# Patient Record
Sex: FEMALE | Race: WHITE | NOT HISPANIC OR LATINO | Employment: OTHER | URBAN - METROPOLITAN AREA
[De-identification: names, ages, dates, MRNs, and addresses within clinical notes are randomized per-mention and may not be internally consistent; named-entity substitution may affect disease eponyms.]

---

## 2023-04-20 ENCOUNTER — OFFICE VISIT (OUTPATIENT)
Dept: PRIMARY CARE | Facility: CLINIC | Age: 53
End: 2023-04-20
Payer: COMMERCIAL

## 2023-04-20 VITALS
SYSTOLIC BLOOD PRESSURE: 100 MMHG | OXYGEN SATURATION: 98 % | WEIGHT: 140 LBS | BODY MASS INDEX: 25.76 KG/M2 | TEMPERATURE: 97.2 F | DIASTOLIC BLOOD PRESSURE: 70 MMHG | HEIGHT: 62 IN | HEART RATE: 88 BPM

## 2023-04-20 DIAGNOSIS — J01.00 ACUTE NON-RECURRENT MAXILLARY SINUSITIS: Primary | ICD-10-CM

## 2023-04-20 PROBLEM — R51.9 HEADACHE: Status: ACTIVE | Noted: 2023-04-20

## 2023-04-20 PROBLEM — M06.4 POLYARTHROPATHY, INFLAMMATORY (MULTI): Status: ACTIVE | Noted: 2023-04-20

## 2023-04-20 PROBLEM — E03.9 ADULT HYPOTHYROIDISM: Status: ACTIVE | Noted: 2023-04-20

## 2023-04-20 PROBLEM — M25.50 ARTHRALGIA OF MULTIPLE SITES: Status: ACTIVE | Noted: 2023-04-20

## 2023-04-20 PROBLEM — M79.646 FINGER PAIN: Status: ACTIVE | Noted: 2023-04-20

## 2023-04-20 PROBLEM — N95.0 POSTMENOPAUSAL BLEEDING: Status: ACTIVE | Noted: 2023-04-20

## 2023-04-20 PROBLEM — R92.30 DENSE BREAST TISSUE ON MAMMOGRAM: Status: ACTIVE | Noted: 2023-04-20

## 2023-04-20 PROBLEM — G43.909 MIGRAINE: Status: ACTIVE | Noted: 2023-04-20

## 2023-04-20 PROBLEM — R92.8 ABNORMAL MAMMOGRAM OF LEFT BREAST: Status: ACTIVE | Noted: 2023-04-20

## 2023-04-20 PROBLEM — E28.39 PREMATURE OVARIAN FAILURE: Status: ACTIVE | Noted: 2023-04-20

## 2023-04-20 PROBLEM — R31.9 HEMATURIA: Status: ACTIVE | Noted: 2023-04-20

## 2023-04-20 PROBLEM — F32.A DEPRESSION: Status: ACTIVE | Noted: 2023-04-20

## 2023-04-20 PROBLEM — M20.012 MALLET FINGER OF LEFT HAND: Status: ACTIVE | Noted: 2023-04-20

## 2023-04-20 PROCEDURE — 1036F TOBACCO NON-USER: CPT | Performed by: INTERNAL MEDICINE

## 2023-04-20 PROCEDURE — 99213 OFFICE O/P EST LOW 20 MIN: CPT | Performed by: INTERNAL MEDICINE

## 2023-04-20 RX ORDER — CELECOXIB 200 MG/1
CAPSULE ORAL
COMMUNITY
Start: 2022-01-25 | End: 2023-06-23

## 2023-04-20 RX ORDER — SUMATRIPTAN SUCCINATE 100 MG/1
TABLET ORAL
COMMUNITY
Start: 2021-12-08

## 2023-04-20 RX ORDER — PHENTERMINE HYDROCHLORIDE 37.5 MG/1
1 TABLET ORAL DAILY
COMMUNITY
Start: 2022-10-05 | End: 2024-06-05 | Stop reason: ALTCHOICE

## 2023-04-20 RX ORDER — CHOLECALCIFEROL (VITAMIN D3) 50 MCG
TABLET ORAL
COMMUNITY
Start: 2018-06-29

## 2023-04-20 RX ORDER — PNV NO.95/FERROUS FUM/FOLIC AC 28MG-0.8MG
TABLET ORAL
COMMUNITY
Start: 2018-06-29

## 2023-04-20 RX ORDER — CETIRIZINE HYDROCHLORIDE 10 MG/1
10 TABLET ORAL DAILY
Qty: 30 TABLET | Refills: 2 | Status: SHIPPED | OUTPATIENT
Start: 2023-04-20 | End: 2023-07-19

## 2023-04-20 RX ORDER — ESTRADIOL AND NORETHINDRONE ACETATE 1; .5 MG/1; MG/1
1 TABLET ORAL DAILY
COMMUNITY
Start: 2020-10-27 | End: 2024-06-07 | Stop reason: SDUPTHER

## 2023-04-20 RX ORDER — BUPROPION HYDROCHLORIDE 300 MG/1
1 TABLET ORAL DAILY
COMMUNITY
Start: 2021-10-14 | End: 2024-04-03 | Stop reason: SDUPTHER

## 2023-04-20 RX ORDER — LEVOTHYROXINE SODIUM 150 UG/1
1 TABLET ORAL DAILY
COMMUNITY
End: 2023-04-24

## 2023-04-20 RX ORDER — AZITHROMYCIN 250 MG/1
TABLET, FILM COATED ORAL
Qty: 6 TABLET | Refills: 0 | Status: SHIPPED | OUTPATIENT
Start: 2023-04-20 | End: 2023-04-25

## 2023-04-20 ASSESSMENT — ENCOUNTER SYMPTOMS
SORE THROAT: 0
SINUS PRESSURE: 1
CHILLS: 0
BACK PAIN: 0
HEADACHES: 1
CHEST TIGHTNESS: 0
NAUSEA: 0
DIAPHORESIS: 0
NECK PAIN: 0
COUGH: 1
CONFUSION: 0
PALPITATIONS: 0
DIZZINESS: 0
AGITATION: 0
SHORTNESS OF BREATH: 1
SWOLLEN GLANDS: 0
DYSURIA: 0
COLOR CHANGE: 0
HEMATURIA: 0
WHEEZING: 0
VOMITING: 0
DIFFICULTY URINATING: 0
VOICE CHANGE: 1
HOARSE VOICE: 1
HALLUCINATIONS: 0

## 2023-04-20 ASSESSMENT — PAIN SCALES - GENERAL: PAINLEVEL: 0-NO PAIN

## 2023-04-20 NOTE — PROGRESS NOTES
"Subjective   Patient ID: Lynette Severino is a 52 y.o. female who presents for Sinusitis (SINUS CONGESTION PAST 2 WEEKS).    Sinusitis  This is a new problem. The current episode started 1 to 4 weeks ago. The problem has been gradually worsening since onset. There has been no fever. Her pain is at a severity of 0/10. She is experiencing no pain. Associated symptoms include congestion, coughing, headaches, a hoarse voice, shortness of breath and sinus pressure. Pertinent negatives include no chills, diaphoresis, ear pain, neck pain, sneezing, sore throat or swollen glands. Past treatments include oral decongestants. The treatment provided no relief.      Review of Systems   Constitutional:  Negative for chills and diaphoresis.   HENT:  Positive for congestion, hoarse voice, sinus pressure and voice change. Negative for ear pain, sneezing and sore throat.    Respiratory:  Positive for cough and shortness of breath. Negative for chest tightness and wheezing.    Cardiovascular:  Negative for chest pain and palpitations.   Gastrointestinal:  Negative for nausea and vomiting.   Genitourinary:  Negative for decreased urine volume, difficulty urinating, dysuria and hematuria.   Musculoskeletal:  Negative for back pain and neck pain.   Skin:  Negative for color change and rash.   Neurological:  Positive for headaches. Negative for dizziness and syncope.   Psychiatric/Behavioral:  Negative for agitation, behavioral problems, confusion and hallucinations.      Objective   /70   Pulse 88   Temp 36.2 °C (97.2 °F)   Ht 1.575 m (5' 2\")   Wt 63.5 kg (140 lb)   SpO2 98%   BMI 25.61 kg/m²     Physical Exam  Constitutional:       General: She is not in acute distress.     Appearance: She is ill-appearing. She is not toxic-appearing.   HENT:      Head: Normocephalic and atraumatic.      Nose: Congestion and rhinorrhea present.   Eyes:      General: No scleral icterus.        Right eye: No discharge.         Left eye: No " discharge.      Conjunctiva/sclera: Conjunctivae normal.   Cardiovascular:      Rate and Rhythm: Normal rate and regular rhythm.      Pulses: Normal pulses.      Heart sounds: Normal heart sounds. No murmur heard.  Pulmonary:      Effort: Pulmonary effort is normal. No respiratory distress.      Breath sounds: Rhonchi present. No wheezing.   Musculoskeletal:         General: Normal range of motion.      Cervical back: Normal range of motion.   Skin:     General: Skin is warm.      Coloration: Skin is not jaundiced.      Findings: No rash.   Neurological:      General: No focal deficit present.      Mental Status: She is alert and oriented to person, place, and time.   Psychiatric:         Mood and Affect: Mood normal.         Behavior: Behavior normal.         Thought Content: Thought content normal.         Judgment: Judgment normal.       Assessment/Plan   Problem List Items Addressed This Visit    None  Visit Diagnoses       Acute non-recurrent maxillary sinusitis    -  Primary    Relevant Medications    azithromycin (Zithromax) 250 mg tablet    cetirizine (ZyrTEC) 10 mg tablet     RTC at your next office visit.

## 2023-04-22 DIAGNOSIS — E03.9 ADULT HYPOTHYROIDISM: Primary | ICD-10-CM

## 2023-04-24 RX ORDER — LEVOTHYROXINE SODIUM 150 UG/1
TABLET ORAL
Qty: 90 TABLET | Refills: 0 | Status: SHIPPED | OUTPATIENT
Start: 2023-04-24 | End: 2023-08-21 | Stop reason: SDUPTHER

## 2023-06-22 DIAGNOSIS — M25.50 ARTHRALGIA OF MULTIPLE SITES: Primary | ICD-10-CM

## 2023-06-23 RX ORDER — CELECOXIB 200 MG/1
CAPSULE ORAL
Qty: 90 CAPSULE | Refills: 0 | Status: SHIPPED | OUTPATIENT
Start: 2023-06-23 | End: 2023-10-17

## 2023-07-18 ENCOUNTER — OFFICE VISIT (OUTPATIENT)
Dept: PRIMARY CARE | Facility: CLINIC | Age: 53
End: 2023-07-18
Payer: COMMERCIAL

## 2023-07-18 VITALS
OXYGEN SATURATION: 99 % | WEIGHT: 144 LBS | SYSTOLIC BLOOD PRESSURE: 116 MMHG | DIASTOLIC BLOOD PRESSURE: 72 MMHG | BODY MASS INDEX: 26.5 KG/M2 | TEMPERATURE: 97.5 F | HEART RATE: 86 BPM | HEIGHT: 62 IN

## 2023-07-18 DIAGNOSIS — R06.02 SHORTNESS OF BREATH: Primary | ICD-10-CM

## 2023-07-18 PROCEDURE — 99213 OFFICE O/P EST LOW 20 MIN: CPT | Performed by: INTERNAL MEDICINE

## 2023-07-18 PROCEDURE — 1036F TOBACCO NON-USER: CPT | Performed by: INTERNAL MEDICINE

## 2023-07-18 RX ORDER — ALBUTEROL SULFATE 90 UG/1
2 AEROSOL, METERED RESPIRATORY (INHALATION) EVERY 4 HOURS PRN
Qty: 8 G | Refills: 5 | Status: SHIPPED | OUTPATIENT
Start: 2023-07-18 | End: 2024-06-05 | Stop reason: ALTCHOICE

## 2023-07-18 ASSESSMENT — ENCOUNTER SYMPTOMS
SHORTNESS OF BREATH: 1
SLEEP DISTURBANCE: 0
VOICE CHANGE: 0
ABDOMINAL PAIN: 0
BACK PAIN: 0
HEMATURIA: 0
HEMOPTYSIS: 0
CHEST TIGHTNESS: 0
CONFUSION: 0
WEAKNESS: 0
APPETITE CHANGE: 0
NERVOUS/ANXIOUS: 0
SORE THROAT: 0
SINUS PAIN: 0
FLANK PAIN: 0
HALLUCINATIONS: 0
DIARRHEA: 0
VOMITING: 0
EYE PAIN: 0
ACTIVITY CHANGE: 0
DIZZINESS: 0
CONSTIPATION: 0
FACIAL ASYMMETRY: 0
SEIZURES: 0
DYSURIA: 0
WHEEZING: 0
NECK PAIN: 0
NAUSEA: 0
RHINORRHEA: 0
LEG PAIN: 0
POLYDIPSIA: 0
ADENOPATHY: 0
POLYPHAGIA: 0
SYNCOPE: 0
PALPITATIONS: 0
TROUBLE SWALLOWING: 0
NUMBNESS: 0
FATIGUE: 0
UNEXPECTED WEIGHT CHANGE: 0
SPEECH DIFFICULTY: 0
STRIDOR: 0
MYALGIAS: 0
BLOOD IN STOOL: 0
PHOTOPHOBIA: 0
HEADACHES: 0
FEVER: 0
WOUND: 0
SWOLLEN GLANDS: 0

## 2023-07-18 ASSESSMENT — COPD QUESTIONNAIRES: COPD: 0

## 2023-07-18 ASSESSMENT — PAIN SCALES - GENERAL: PAINLEVEL: 0-NO PAIN

## 2023-07-18 NOTE — PROGRESS NOTES
Subjective   Patient ID: Lynette Severino is a 53 y.o. female who presents for Shortness of Breath (Dizziness, fatigue since Thursday night).    Shortness of Breath  This is a new problem. The current episode started in the past 7 days. The problem occurs daily. The problem has been gradually worsening. Pertinent negatives include no abdominal pain, chest pain, coryza, ear pain, fever, headaches, hemoptysis, leg pain, leg swelling, neck pain, rash, rhinorrhea, sore throat, swollen glands, syncope, vomiting or wheezing. The symptoms are aggravated by any activity. The patient has no known risk factors for DVT/PE. She has tried nothing for the symptoms. Her past medical history is significant for allergies. There is no history of CAD, chronic lung disease, COPD, DVT, pneumonia or a recent surgery.      Review of Systems   Constitutional:  Negative for activity change, appetite change, fatigue, fever and unexpected weight change.   HENT:  Negative for dental problem, ear discharge, ear pain, hearing loss, nosebleeds, postnasal drip, rhinorrhea, sinus pain, sore throat, trouble swallowing and voice change.    Eyes:  Negative for photophobia, pain and visual disturbance.   Respiratory:  Positive for shortness of breath. Negative for hemoptysis, chest tightness, wheezing and stridor.    Cardiovascular:  Negative for chest pain, palpitations, leg swelling and syncope.   Gastrointestinal:  Negative for abdominal pain, blood in stool, constipation, diarrhea, nausea and vomiting.   Endocrine: Negative for polydipsia, polyphagia and polyuria.   Genitourinary:  Negative for decreased urine volume, dyspareunia, dysuria, flank pain, hematuria and urgency.   Musculoskeletal:  Negative for back pain, gait problem, myalgias and neck pain.   Skin:  Negative for rash and wound.   Allergic/Immunologic: Negative for environmental allergies and food allergies.   Neurological:  Negative for dizziness, seizures, syncope, facial  "asymmetry, speech difficulty, weakness, numbness and headaches.   Hematological:  Negative for adenopathy.   Psychiatric/Behavioral:  Negative for behavioral problems, confusion, hallucinations, sleep disturbance and suicidal ideas. The patient is not nervous/anxious.      Objective   /72   Pulse 86   Temp 36.4 °C (97.5 °F)   Ht 1.575 m (5' 2\")   Wt 65.3 kg (144 lb)   SpO2 99%   BMI 26.34 kg/m²     Physical Exam  Constitutional:       General: She is not in acute distress.     Appearance: Normal appearance. She is not ill-appearing or toxic-appearing.   HENT:      Nose: Nose normal.   Eyes:      General:         Right eye: No discharge.         Left eye: No discharge.      Conjunctiva/sclera: Conjunctivae normal.   Cardiovascular:      Rate and Rhythm: Normal rate and regular rhythm.      Pulses: Normal pulses.      Heart sounds: Normal heart sounds. No murmur heard.  Pulmonary:      Effort: Pulmonary effort is normal. No respiratory distress.      Breath sounds: Normal breath sounds. No stridor. No wheezing, rhonchi or rales.   Musculoskeletal:         General: Normal range of motion.      Cervical back: Normal range of motion.   Skin:     General: Skin is warm.   Neurological:      General: No focal deficit present.      Mental Status: She is alert and oriented to person, place, and time.   Psychiatric:         Mood and Affect: Mood normal.         Behavior: Behavior normal.         Thought Content: Thought content normal.         Judgment: Judgment normal.       Assessment/Plan   Problem List Items Addressed This Visit    None  Visit Diagnoses       Shortness of breath    -  Primary    Relevant Medications    albuterol (Ventolin HFA) 90 mcg/actuation inhaler    Other Relevant Orders    XR chest 2 views     RTC at your next office visit.  "

## 2023-08-21 DIAGNOSIS — E03.9 ADULT HYPOTHYROIDISM: ICD-10-CM

## 2023-08-22 RX ORDER — LEVOTHYROXINE SODIUM 150 UG/1
150 TABLET ORAL DAILY
Qty: 90 TABLET | Refills: 3 | Status: SHIPPED | OUTPATIENT
Start: 2023-08-22

## 2023-10-16 DIAGNOSIS — M25.50 ARTHRALGIA OF MULTIPLE SITES: ICD-10-CM

## 2023-10-17 RX ORDER — CELECOXIB 200 MG/1
CAPSULE ORAL
Qty: 90 CAPSULE | Refills: 1 | Status: SHIPPED | OUTPATIENT
Start: 2023-10-17 | End: 2024-05-02 | Stop reason: SDUPTHER

## 2024-04-03 DIAGNOSIS — Z00.00 ANNUAL PHYSICAL EXAM: ICD-10-CM

## 2024-04-03 RX ORDER — BUPROPION HYDROCHLORIDE 300 MG/1
300 TABLET ORAL DAILY
Qty: 90 TABLET | Refills: 3 | Status: SHIPPED | OUTPATIENT
Start: 2024-04-03 | End: 2024-06-06

## 2024-05-02 DIAGNOSIS — M25.50 ARTHRALGIA OF MULTIPLE SITES: ICD-10-CM

## 2024-05-02 PROBLEM — E66.3 OVERWEIGHT (BMI 25.0-29.9): Status: ACTIVE | Noted: 2024-05-02

## 2024-05-02 PROBLEM — J01.00 MAXILLARY SINUSITIS, ACUTE: Status: RESOLVED | Noted: 2024-05-02 | Resolved: 2024-05-02

## 2024-05-02 PROBLEM — M54.50 ACUTE EXACERBATION OF CHRONIC LOW BACK PAIN: Status: RESOLVED | Noted: 2024-05-02 | Resolved: 2024-05-02

## 2024-05-02 PROBLEM — M47.14 SPONDYLOSIS WITH MYELOPATHY, THORACIC REGION: Status: ACTIVE | Noted: 2019-10-24

## 2024-05-02 PROBLEM — G89.29 ACUTE EXACERBATION OF CHRONIC LOW BACK PAIN: Status: RESOLVED | Noted: 2024-05-02 | Resolved: 2024-05-02

## 2024-05-02 PROBLEM — J03.00 STREPTOCOCCAL TONSILLITIS: Status: ACTIVE | Noted: 2024-05-02

## 2024-05-02 PROBLEM — R11.0 NAUSEA IN ADULT: Status: ACTIVE | Noted: 2024-05-02

## 2024-05-02 PROBLEM — J02.9 SORE THROAT: Status: ACTIVE | Noted: 2024-05-02

## 2024-05-02 PROBLEM — M46.1 SACROILIITIS (CMS-HCC): Status: ACTIVE | Noted: 2022-10-03

## 2024-05-02 RX ORDER — CELECOXIB 200 MG/1
200 CAPSULE ORAL DAILY PRN
Qty: 90 CAPSULE | Refills: 1 | Status: SHIPPED | OUTPATIENT
Start: 2024-05-02 | End: 2024-06-06

## 2024-05-29 ENCOUNTER — LAB (OUTPATIENT)
Dept: LAB | Facility: LAB | Age: 54
End: 2024-05-29
Payer: COMMERCIAL

## 2024-05-29 DIAGNOSIS — Z00.00 ANNUAL PHYSICAL EXAM: ICD-10-CM

## 2024-05-29 LAB
ALBUMIN SERPL BCP-MCNC: 4.3 G/DL (ref 3.4–5)
ALP SERPL-CCNC: 47 U/L (ref 33–110)
ALT SERPL W P-5'-P-CCNC: 10 U/L (ref 7–45)
ANION GAP SERPL CALC-SCNC: 12 MMOL/L (ref 10–20)
APPEARANCE UR: ABNORMAL
AST SERPL W P-5'-P-CCNC: 12 U/L (ref 9–39)
BASOPHILS # BLD AUTO: 0.06 X10*3/UL (ref 0–0.1)
BASOPHILS NFR BLD AUTO: 1.3 %
BILIRUB SERPL-MCNC: 0.5 MG/DL (ref 0–1.2)
BILIRUB UR STRIP.AUTO-MCNC: NEGATIVE MG/DL
BUN SERPL-MCNC: 11 MG/DL (ref 6–23)
CALCIUM SERPL-MCNC: 8.8 MG/DL (ref 8.6–10.3)
CHLORIDE SERPL-SCNC: 103 MMOL/L (ref 98–107)
CHOLEST SERPL-MCNC: 164 MG/DL (ref 0–199)
CHOLESTEROL/HDL RATIO: 2.6
CO2 SERPL-SCNC: 28 MMOL/L (ref 21–32)
COLOR UR: ABNORMAL
CREAT SERPL-MCNC: 0.66 MG/DL (ref 0.5–1.05)
EGFRCR SERPLBLD CKD-EPI 2021: >90 ML/MIN/1.73M*2
EOSINOPHIL # BLD AUTO: 0.09 X10*3/UL (ref 0–0.7)
EOSINOPHIL NFR BLD AUTO: 1.9 %
ERYTHROCYTE [DISTWIDTH] IN BLOOD BY AUTOMATED COUNT: 11.8 % (ref 11.5–14.5)
EST. AVERAGE GLUCOSE BLD GHB EST-MCNC: 94 MG/DL
GLUCOSE SERPL-MCNC: 83 MG/DL (ref 74–99)
GLUCOSE UR STRIP.AUTO-MCNC: NORMAL MG/DL
HBA1C MFR BLD: 4.9 %
HCT VFR BLD AUTO: 42.1 % (ref 36–46)
HDLC SERPL-MCNC: 63.8 MG/DL
HGB BLD-MCNC: 13.9 G/DL (ref 12–16)
IMM GRANULOCYTES # BLD AUTO: 0.01 X10*3/UL (ref 0–0.7)
IMM GRANULOCYTES NFR BLD AUTO: 0.2 % (ref 0–0.9)
KETONES UR STRIP.AUTO-MCNC: NEGATIVE MG/DL
LDLC SERPL CALC-MCNC: 87 MG/DL
LEUKOCYTE ESTERASE UR QL STRIP.AUTO: NEGATIVE
LYMPHOCYTES # BLD AUTO: 1.48 X10*3/UL (ref 1.2–4.8)
LYMPHOCYTES NFR BLD AUTO: 32 %
MCH RBC QN AUTO: 30.4 PG (ref 26–34)
MCHC RBC AUTO-ENTMCNC: 33 G/DL (ref 32–36)
MCV RBC AUTO: 92 FL (ref 80–100)
MONOCYTES # BLD AUTO: 0.34 X10*3/UL (ref 0.1–1)
MONOCYTES NFR BLD AUTO: 7.4 %
MUCOUS THREADS #/AREA URNS AUTO: ABNORMAL /LPF
NEUTROPHILS # BLD AUTO: 2.64 X10*3/UL (ref 1.2–7.7)
NEUTROPHILS NFR BLD AUTO: 57.2 %
NITRITE UR QL STRIP.AUTO: NEGATIVE
NON HDL CHOLESTEROL: 100 MG/DL (ref 0–149)
NRBC BLD-RTO: 0 /100 WBCS (ref 0–0)
PH UR STRIP.AUTO: 6 [PH]
PLATELET # BLD AUTO: 366 X10*3/UL (ref 150–450)
POTASSIUM SERPL-SCNC: 4.2 MMOL/L (ref 3.5–5.3)
PROT SERPL-MCNC: 6.7 G/DL (ref 6.4–8.2)
PROT UR STRIP.AUTO-MCNC: NEGATIVE MG/DL
RBC # BLD AUTO: 4.57 X10*6/UL (ref 4–5.2)
RBC # UR STRIP.AUTO: ABNORMAL /UL
RBC #/AREA URNS AUTO: ABNORMAL /HPF
SODIUM SERPL-SCNC: 139 MMOL/L (ref 136–145)
SP GR UR STRIP.AUTO: 1.02
SQUAMOUS #/AREA URNS AUTO: ABNORMAL /HPF
TRIGL SERPL-MCNC: 64 MG/DL (ref 0–149)
TSH SERPL-ACNC: 0.11 MIU/L (ref 0.44–3.98)
UROBILINOGEN UR STRIP.AUTO-MCNC: NORMAL MG/DL
VLDL: 13 MG/DL (ref 0–40)
WBC # BLD AUTO: 4.6 X10*3/UL (ref 4.4–11.3)
WBC #/AREA URNS AUTO: ABNORMAL /HPF

## 2024-05-29 PROCEDURE — 80061 LIPID PANEL: CPT

## 2024-05-29 PROCEDURE — 85025 COMPLETE CBC W/AUTO DIFF WBC: CPT

## 2024-05-29 PROCEDURE — 82306 VITAMIN D 25 HYDROXY: CPT

## 2024-05-29 PROCEDURE — 36415 COLL VENOUS BLD VENIPUNCTURE: CPT

## 2024-05-29 PROCEDURE — 81001 URINALYSIS AUTO W/SCOPE: CPT

## 2024-05-29 PROCEDURE — 80053 COMPREHEN METABOLIC PANEL: CPT

## 2024-05-29 PROCEDURE — 83036 HEMOGLOBIN GLYCOSYLATED A1C: CPT

## 2024-05-29 PROCEDURE — 84443 ASSAY THYROID STIM HORMONE: CPT

## 2024-05-30 LAB — 25(OH)D3 SERPL-MCNC: 31 NG/ML (ref 30–100)

## 2024-06-05 ENCOUNTER — OFFICE VISIT (OUTPATIENT)
Dept: PRIMARY CARE | Facility: CLINIC | Age: 54
End: 2024-06-05
Payer: COMMERCIAL

## 2024-06-05 VITALS
WEIGHT: 150 LBS | HEART RATE: 72 BPM | SYSTOLIC BLOOD PRESSURE: 110 MMHG | DIASTOLIC BLOOD PRESSURE: 70 MMHG | BODY MASS INDEX: 27.44 KG/M2 | OXYGEN SATURATION: 97 % | TEMPERATURE: 98.2 F

## 2024-06-05 DIAGNOSIS — M25.50 ARTHRALGIA OF MULTIPLE SITES: ICD-10-CM

## 2024-06-05 DIAGNOSIS — Z79.899 MEDICATION MANAGEMENT: ICD-10-CM

## 2024-06-05 DIAGNOSIS — Z00.00 ANNUAL PHYSICAL EXAM: ICD-10-CM

## 2024-06-05 DIAGNOSIS — G95.9 MYELOPATHY (MULTI): ICD-10-CM

## 2024-06-05 DIAGNOSIS — E66.3 OVERWEIGHT (BMI 25.0-29.9): ICD-10-CM

## 2024-06-05 DIAGNOSIS — M06.4 POLYARTHROPATHY, INFLAMMATORY (MULTI): ICD-10-CM

## 2024-06-05 DIAGNOSIS — G40.109 TEMPORAL LOBE EPILEPSY (MULTI): ICD-10-CM

## 2024-06-05 DIAGNOSIS — Z00.00 ROUTINE GENERAL MEDICAL EXAMINATION AT A HEALTH CARE FACILITY: Primary | ICD-10-CM

## 2024-06-05 PROBLEM — M25.519 SHOULDER PAIN: Status: ACTIVE | Noted: 2024-06-05
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
## 2024-06-05 PROBLEM — M54.50 CHRONIC LOW BACK PAIN: Status: ACTIVE | Noted: 2024-06-05

## 2024-06-05 PROBLEM — G43.909 MIGRAINE HEADACHE: Status: ACTIVE | Noted: 2024-06-05

## 2024-06-05 PROBLEM — R11.0 NAUSEA: Status: ACTIVE | Noted: 2024-06-05

## 2024-06-05 PROBLEM — M25.559 ARTHRALGIA OF HIP: Status: ACTIVE | Noted: 2024-06-05

## 2024-06-05 PROBLEM — R32 URINARY INCONTINENCE: Status: ACTIVE | Noted: 2024-06-05

## 2024-06-05 PROBLEM — F32.A DEPRESSIVE DISORDER: Status: ACTIVE | Noted: 2024-06-05

## 2024-06-05 PROBLEM — R92.8 ABNORMAL MAMMOGRAM: Status: ACTIVE | Noted: 2024-06-05

## 2024-06-05 PROBLEM — M20.009 ACQUIRED DEFORMITY OF DISTAL INTERPHALANGEAL (DIP) JOINT OF FINGER DUE TO TRAUMA: Status: ACTIVE | Noted: 2024-06-05

## 2024-06-05 PROBLEM — K04.90: Status: ACTIVE | Noted: 2024-06-05

## 2024-06-05 PROBLEM — G89.29 CHRONIC LOW BACK PAIN: Status: ACTIVE | Noted: 2024-06-05

## 2024-06-05 PROBLEM — M79.646 PAIN OF FINGER: Status: ACTIVE | Noted: 2024-06-05

## 2024-06-05 PROBLEM — B01.9 VARICELLA: Status: ACTIVE | Noted: 2024-06-05

## 2024-06-05 LAB
AMPHETAMINES UR QL SCN: NORMAL
BARBITURATES UR QL SCN: NORMAL
BENZODIAZ UR QL SCN: NORMAL
BZE UR QL SCN: NORMAL
CANNABINOIDS UR QL SCN: NORMAL
FENTANYL+NORFENTANYL UR QL SCN: NORMAL
METHADONE UR QL SCN: NORMAL
OPIATES UR QL SCN: NORMAL
OXYCODONE+OXYMORPHONE UR QL SCN: NORMAL
PCP UR QL SCN: NORMAL

## 2024-06-05 PROCEDURE — 80307 DRUG TEST PRSMV CHEM ANLYZR: CPT

## 2024-06-05 PROCEDURE — 99396 PREV VISIT EST AGE 40-64: CPT | Performed by: FAMILY MEDICINE

## 2024-06-05 RX ORDER — PHENTERMINE HYDROCHLORIDE 37.5 MG/1
37.5 CAPSULE ORAL
Qty: 30 CAPSULE | Refills: 0 | Status: SHIPPED | OUTPATIENT
Start: 2024-06-05 | End: 2024-07-05

## 2024-06-05 ASSESSMENT — PAIN SCALES - GENERAL: PAINLEVEL: 0-NO PAIN

## 2024-06-05 NOTE — PROGRESS NOTES
J wanted to go Saturday.  Subjective   Patient ID: Lynette Severino is a 53 y.o. female.    Lynette Severino comes in today for physical exam.  There has been no chest pain, shortness of breath, fever, chills, unexplained weight loss, rectal bleeding or any other unusual symptoms.  She is very frustrated with her weight.  She has lost quite a bit on phentermine and she had started working at home and had a lot of life changes and unfortunately has gained some back.  She would like to do it again.  BMI is 27.44 but she has got history of myelopathy which flares up and arthritis that flares up on her weight is higher.  I consider that a comorbid condition.  Recent labs, diagnostics and pertinent information has been reviewed. Patient is attempting to eat a healthy diet and incorporate exercise in to their lifestyle. Patient does not smoke. Alcohol in moderation. Patient is practicing routine eye and dental care. Reviewed employment status. No uncontrolled anxiety, depression. Reviewed current medications, if any.      OARRS:  Chiqui Sheppard MD on 6/5/2024  4:02 PM  I have personally reviewed the OARRS report for Lynette Severino. I have considered the risks of abuse, dependence, addiction and diversion and I believe that it is clinically appropriate for Lynette Severino to be prescribed this medication    Is the patient prescribed a combination of a benzodiazepine and opioid?  Yes, I feel it is clincially indicated to continue the medication and have discussed with the patient risks/benefits/alternatives.    Last Urine Drug Screen / ordered today: Yes  Recent Results (from the past 8760 hour(s))   Drug Screen, Urine With Reflex to Confirmation    Collection Time: 06/05/24  4:29 PM   Result Value Ref Range    Amphetamine Screen, Urine Presumptive Negative Presumptive Negative    Barbiturate Screen, Urine Presumptive Negative Presumptive Negative    Benzodiazepines Screen, Urine Presumptive Negative  Presumptive Negative    Cannabinoid Screen, Urine Presumptive Negative Presumptive Negative    Cocaine Metabolite Screen, Urine Presumptive Negative Presumptive Negative    Fentanyl Screen, Urine Presumptive Negative Presumptive Negative    Opiate Screen, Urine Presumptive Negative Presumptive Negative    Oxycodone Screen, Urine Presumptive Negative Presumptive Negative    PCP Screen, Urine Presumptive Negative Presumptive Negative    Methadone Screen, Urine Presumptive Negative Presumptive Negative     Results are as expected.         Controlled Substance Agreement:  Date of the Last Agreement: today  Reviewed Controlled Substance Agreement including but not limited to the benefits, risks, and alternatives to treatment with a Controlled Substance medication(s).    Anorexiants:   What is the patient's goal of therapy? Lose weight  Is this being achieved with current treatment? Has in past    I have assessed the patient's continuing efforts to lose weight., I have assessed the patient's dedication to the treatment program and the response to treatment., and I have assessed the presence or absence of contraindications, adverse effects, and indicators of possible substance abuse that would necessitate cessation of treatment utilizing controlled substance.    Patient has demonstrated continued efforts to lose weight, is dedicated to the treatment program and the response to treatment. and I have assessed for the presence or absence of contraindications, adverse effects, and indicators of possible substance abuse that would necessitate cessation of treatment utilizing controlled substance.    Activities of Daily Living:   Is your overall impression that this patient is benefiting (symptom reduction outweighs side effects) from anorexiants therapy? Yes     1. Physical Functioning: Better  2. Family Relationship: Same  3. Social Relationship: Same  4. Mood: Same  5. Sleep Patterns: Same  6. Overall Function:  Better      Review of Systems   Constitutional:  Negative for fatigue, fever and unexpected weight change.   HENT:  Negative for congestion, ear pain, hearing loss, sore throat and trouble swallowing.    Eyes:  Negative for pain and visual disturbance.   Respiratory:  Negative for cough and shortness of breath.    Cardiovascular:  Negative for chest pain, palpitations and leg swelling.   Gastrointestinal:  Negative for abdominal pain, blood in stool, diarrhea, nausea and vomiting.   Genitourinary:  Negative for dysuria, frequency, hematuria and urgency.   Musculoskeletal:  Negative for joint swelling.   Skin:  Negative for pallor and rash.   Neurological:  Negative for dizziness, syncope, weakness, numbness and headaches.   Psychiatric/Behavioral:  Negative for confusion, decreased concentration, hallucinations and suicidal ideas.      Vitals:    06/05/24 1538   BP: 110/70   Pulse: 72   Temp: 36.8 °C (98.2 °F)   SpO2: 97%      Objective   Physical Exam  Constitutional:       Appearance: Normal appearance.      Comments: Overweight.   HENT:      Head: Normocephalic and atraumatic.      Right Ear: Tympanic membrane and external ear normal.      Left Ear: Tympanic membrane and external ear normal.      Nose: Nose normal.      Mouth/Throat:      Mouth: Mucous membranes are moist.      Pharynx: Oropharynx is clear. No oropharyngeal exudate.   Eyes:      Extraocular Movements: Extraocular movements intact.      Conjunctiva/sclera: Conjunctivae normal.      Pupils: Pupils are equal, round, and reactive to light.   Cardiovascular:      Rate and Rhythm: Normal rate and regular rhythm.      Heart sounds: Normal heart sounds.   Pulmonary:      Effort: Pulmonary effort is normal.      Breath sounds: Normal breath sounds.   Abdominal:      General: Abdomen is flat.      Palpations: Abdomen is soft. There is no mass.      Tenderness: There is no abdominal tenderness. There is no guarding.   Musculoskeletal:      Cervical back:  Neck supple.   Lymphadenopathy:      Cervical: No cervical adenopathy.   Skin:     General: Skin is warm and dry.   Neurological:      General: No focal deficit present.      Mental Status: She is alert.   Psychiatric:         Mood and Affect: Mood normal.         Speech: Speech normal.         Behavior: Behavior normal.         Cognition and Memory: Cognition normal.         Assessment/Plan   There are no diagnoses linked to this encounter.

## 2024-06-06 PROBLEM — G95.9 MYELOPATHY (MULTI): Status: ACTIVE | Noted: 2024-06-06

## 2024-06-06 PROBLEM — M25.519 SHOULDER PAIN: Status: RESOLVED | Noted: 2024-06-05 | Resolved: 2024-06-06

## 2024-06-06 PROBLEM — M25.559 ARTHRALGIA OF HIP: Status: RESOLVED | Noted: 2024-06-05 | Resolved: 2024-06-06

## 2024-06-06 PROBLEM — J03.00 STREPTOCOCCAL TONSILLITIS: Status: RESOLVED | Noted: 2024-05-02 | Resolved: 2024-06-06

## 2024-06-06 PROBLEM — R11.0 NAUSEA: Status: RESOLVED | Noted: 2024-06-05 | Resolved: 2024-06-06

## 2024-06-06 PROBLEM — B01.9 VARICELLA: Status: RESOLVED | Noted: 2024-06-05 | Resolved: 2024-06-06

## 2024-06-06 PROBLEM — R92.8 ABNORMAL MAMMOGRAM: Status: RESOLVED | Noted: 2024-06-05 | Resolved: 2024-06-06

## 2024-06-06 PROBLEM — G43.909 MIGRAINE: Status: RESOLVED | Noted: 2023-04-20 | Resolved: 2024-06-06

## 2024-06-06 PROBLEM — M46.1 SACROILIITIS (CMS-HCC): Status: RESOLVED | Noted: 2022-10-03 | Resolved: 2024-06-06

## 2024-06-06 PROBLEM — N95.0 POSTMENOPAUSAL BLEEDING: Status: RESOLVED | Noted: 2023-04-20 | Resolved: 2024-06-06

## 2024-06-06 PROBLEM — M79.646 PAIN OF FINGER: Status: RESOLVED | Noted: 2024-06-05 | Resolved: 2024-06-06

## 2024-06-06 PROBLEM — M06.4 INFLAMMATORY POLYARTHROPATHY (MULTI): Status: RESOLVED | Noted: 2024-06-05 | Resolved: 2024-06-06

## 2024-06-06 PROBLEM — Z00.00 ROUTINE GENERAL MEDICAL EXAMINATION AT A HEALTH CARE FACILITY: Status: ACTIVE | Noted: 2024-06-06

## 2024-06-06 PROBLEM — F32.A DEPRESSIVE DISORDER: Status: RESOLVED | Noted: 2024-06-05 | Resolved: 2024-06-06

## 2024-06-06 PROBLEM — M79.646 FINGER PAIN: Status: RESOLVED | Noted: 2023-04-20 | Resolved: 2024-06-06

## 2024-06-06 PROBLEM — R11.0 NAUSEA IN ADULT: Status: RESOLVED | Noted: 2024-05-02 | Resolved: 2024-06-06

## 2024-06-06 PROBLEM — R51.9 HEADACHE: Status: RESOLVED | Noted: 2023-04-20 | Resolved: 2024-06-06
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
## 2024-06-06 PROBLEM — G43.909 MIGRAINE HEADACHE: Status: RESOLVED | Noted: 2024-06-05 | Resolved: 2024-06-06

## 2024-06-06 PROBLEM — J02.9 SORE THROAT: Status: RESOLVED | Noted: 2024-05-02 | Resolved: 2024-06-06

## 2024-06-06 PROBLEM — M25.50 ARTHRALGIA OF MULTIPLE SITES: Status: RESOLVED | Noted: 2023-04-20 | Resolved: 2024-06-06

## 2024-06-06 RX ORDER — BUPROPION HYDROCHLORIDE 300 MG/1
300 TABLET ORAL DAILY
Qty: 90 TABLET | Refills: 3 | Status: SHIPPED | OUTPATIENT
Start: 2024-06-06

## 2024-06-06 RX ORDER — CELECOXIB 200 MG/1
CAPSULE ORAL
Qty: 90 CAPSULE | Refills: 1 | Status: SHIPPED | OUTPATIENT
Start: 2024-06-06

## 2024-06-06 ASSESSMENT — ENCOUNTER SYMPTOMS
DIZZINESS: 0
COUGH: 0
UNEXPECTED WEIGHT CHANGE: 0
VOMITING: 0
NAUSEA: 0
FATIGUE: 0
NUMBNESS: 0
EYE PAIN: 0
PALPITATIONS: 0
ABDOMINAL PAIN: 0
SHORTNESS OF BREATH: 0
SORE THROAT: 0
HALLUCINATIONS: 0
TROUBLE SWALLOWING: 0
CONFUSION: 0
JOINT SWELLING: 0
DYSURIA: 0
WEAKNESS: 0
DECREASED CONCENTRATION: 0
BLOOD IN STOOL: 0
HEADACHES: 0
DIARRHEA: 0
FEVER: 0
HEMATURIA: 0
FREQUENCY: 0

## 2024-06-06 NOTE — PATIENT INSTRUCTIONS
It was nice to see you today!  Discussed current concerns and addressed   Reviewed recent labs and diagnostics  Reviewed medications list  Continue to eat a healthy diet, exercise at least 3 times a week or more  Plan and follow up discussed  For any further information related to your condition, copy and paste or go to [email or web address]    Restart phentermine  Risk and benefit discussed  Discussed caloric limit for age and gender, [employer] Brittany recommended  Exercise at least 3x a week at least 45 min each session aerobically  OARRS reviewed  CSA and UDS current/updated  Follow up 1 month

## 2024-06-07 DIAGNOSIS — Z79.890 HORMONE REPLACEMENT THERAPY: Primary | ICD-10-CM

## 2024-06-07 RX ORDER — ESTRADIOL AND NORETHINDRONE ACETATE 1; .5 MG/1; MG/1
1 TABLET ORAL DAILY
Qty: 84 TABLET | Refills: 0 | Status: SHIPPED | OUTPATIENT
Start: 2024-06-07

## 2024-06-24 ENCOUNTER — TELEPHONE (OUTPATIENT)
Dept: PRIMARY CARE | Facility: CLINIC | Age: 54
End: 2024-06-24
Payer: COMMERCIAL

## 2024-07-16 ENCOUNTER — APPOINTMENT (OUTPATIENT)
Dept: PRIMARY CARE | Facility: CLINIC | Age: 54
End: 2024-07-16
Payer: COMMERCIAL

## 2024-07-16 VITALS
SYSTOLIC BLOOD PRESSURE: 126 MMHG | OXYGEN SATURATION: 97 % | WEIGHT: 143 LBS | BODY MASS INDEX: 26.31 KG/M2 | DIASTOLIC BLOOD PRESSURE: 82 MMHG | HEIGHT: 62 IN | TEMPERATURE: 98.4 F | HEART RATE: 83 BPM

## 2024-07-16 DIAGNOSIS — E66.3 OVERWEIGHT (BMI 25.0-29.9): Primary | ICD-10-CM

## 2024-07-16 PROCEDURE — 3008F BODY MASS INDEX DOCD: CPT | Performed by: FAMILY MEDICINE

## 2024-07-16 PROCEDURE — 99213 OFFICE O/P EST LOW 20 MIN: CPT | Performed by: FAMILY MEDICINE

## 2024-07-16 RX ORDER — PHENTERMINE HYDROCHLORIDE 37.5 MG/1
37.5 TABLET ORAL
Qty: 30 TABLET | Refills: 0 | Status: SHIPPED | OUTPATIENT
Start: 2024-07-16 | End: 2024-08-15

## 2024-07-18 ASSESSMENT — ENCOUNTER SYMPTOMS
NUMBNESS: 0
DYSURIA: 0
FREQUENCY: 0
ABDOMINAL PAIN: 0
HALLUCINATIONS: 0
TROUBLE SWALLOWING: 0
DIARRHEA: 0
HEADACHES: 0
COUGH: 0
FATIGUE: 0
HEMATURIA: 0
FEVER: 0
WEAKNESS: 0
VOMITING: 0
SHORTNESS OF BREATH: 0
SORE THROAT: 0
NAUSEA: 0
DIZZINESS: 0
PALPITATIONS: 0
UNEXPECTED WEIGHT CHANGE: 0
CONFUSION: 0
EYE PAIN: 0
BLOOD IN STOOL: 0
JOINT SWELLING: 0
DECREASED CONCENTRATION: 0

## 2024-07-18 NOTE — PROGRESS NOTES
Subjective   Patient ID: Lynette Severino is a 54 y.o. female.    Patient here to refill phentermine. She has lost weight. Understands the medication, risk and benefits. No uncontrolled BP. No hx stroke, MI. Is following a healthy diet with a caloric goal below 1200 daily. She is committed to exercising at least 3x a week for at least 40 minutes. She does not smoke. She has no CP, SOB or palpitations.          Review of Systems   Constitutional:  Negative for fatigue, fever and unexpected weight change.   HENT:  Negative for congestion, ear pain, hearing loss, sore throat and trouble swallowing.    Eyes:  Negative for pain and visual disturbance.   Respiratory:  Negative for cough and shortness of breath.    Cardiovascular:  Negative for chest pain, palpitations and leg swelling.   Gastrointestinal:  Negative for abdominal pain, blood in stool, diarrhea, nausea and vomiting.   Genitourinary:  Negative for dysuria, frequency, hematuria and urgency.   Musculoskeletal:  Negative for joint swelling.   Skin:  Negative for pallor and rash.   Neurological:  Negative for dizziness, syncope, weakness, numbness and headaches.   Psychiatric/Behavioral:  Negative for confusion, decreased concentration, hallucinations and suicidal ideas.      Vitals:    07/16/24 1344   BP: 126/82   Pulse: 83   Temp: 36.9 °C (98.4 °F)   SpO2: 97%      Objective   Physical Exam  Constitutional:       Appearance: Normal appearance.      Comments: Overweight.   Cardiovascular:      Rate and Rhythm: Normal rate and regular rhythm.      Heart sounds: Normal heart sounds.   Pulmonary:      Effort: Pulmonary effort is normal.      Breath sounds: Normal breath sounds.   Musculoskeletal:      Cervical back: Neck supple.   Skin:     General: Skin is warm and dry.   Neurological:      General: No focal deficit present.      Mental Status: She is alert.   Psychiatric:         Mood and Affect: Mood normal.         Speech: Speech normal.         Behavior:  Behavior normal.         Cognition and Memory: Cognition normal.         Assessment/Plan   Diagnoses and all orders for this visit:  Overweight (BMI 25.0-29.9)  -     phentermine (Adipex-P) 37.5 mg tablet; Take 1 tablet (37.5 mg) by mouth once daily in the morning. Take before meals. BMI 26.16 continuation of medication

## 2024-07-18 NOTE — PATIENT INSTRUCTIONS
RF phentermine  Risk and benefit discussed  Discussed caloric limit for age and gender, [employer] Brittany recommended  Exercise at least 3x a week at least 45 min each session aerobically  OARRS reviewed  CSA and UDS current/updated  Follow up 1 month

## 2024-08-19 ENCOUNTER — TELEPHONE (OUTPATIENT)
Dept: PRIMARY CARE | Facility: CLINIC | Age: 54
End: 2024-08-19

## 2024-08-19 DIAGNOSIS — U07.1 COVID-19: Primary | ICD-10-CM

## 2024-08-19 DIAGNOSIS — E03.9 ADULT HYPOTHYROIDISM: ICD-10-CM

## 2024-08-19 RX ORDER — LEVOTHYROXINE SODIUM 150 UG/1
150 TABLET ORAL DAILY
Qty: 90 TABLET | Refills: 3 | Status: SHIPPED | OUTPATIENT
Start: 2024-08-19

## 2024-08-19 RX ORDER — NIRMATRELVIR AND RITONAVIR 300-100 MG
3 KIT ORAL 2 TIMES DAILY
Qty: 30 TABLET | Refills: 0 | Status: SHIPPED | OUTPATIENT
Start: 2024-08-19 | End: 2024-08-24

## 2024-08-19 NOTE — TELEPHONE ENCOUNTER
Pt called statin she tested positive for covid this morning and symptoms started Saturday evening wants to know if she can get paxlovid prescription to [facility] in [facility]. Pt has appt tomorrow for med refill, switched to virtual.

## 2024-08-21 ENCOUNTER — APPOINTMENT (OUTPATIENT)
Dept: PRIMARY CARE | Facility: CLINIC | Age: 54
End: 2024-08-21
Payer: COMMERCIAL

## 2024-08-21 DIAGNOSIS — E66.3 OVERWEIGHT (BMI 25.0-29.9): Primary | ICD-10-CM

## 2024-08-21 PROCEDURE — 99213 OFFICE O/P EST LOW 20 MIN: CPT | Performed by: FAMILY MEDICINE

## 2024-08-21 RX ORDER — PHENTERMINE HYDROCHLORIDE 37.5 MG/1
37.5 TABLET ORAL
Qty: 30 TABLET | Refills: 0 | Status: SHIPPED | OUTPATIENT
Start: 2024-08-21 | End: 2024-09-20

## 2024-08-21 ASSESSMENT — ENCOUNTER SYMPTOMS
PALPITATIONS: 0
DIARRHEA: 0
JOINT SWELLING: 0
HEADACHES: 0
NUMBNESS: 0
NAUSEA: 0
TROUBLE SWALLOWING: 0
COUGH: 0
CONFUSION: 0
WEAKNESS: 0
DYSURIA: 0
VOMITING: 0
DECREASED CONCENTRATION: 0
FEVER: 0
RHINORRHEA: 1
HEMATURIA: 0
EYE PAIN: 0
SHORTNESS OF BREATH: 0
FATIGUE: 1
ABDOMINAL PAIN: 0
HALLUCINATIONS: 0
UNEXPECTED WEIGHT CHANGE: 0
BLOOD IN STOOL: 0
DIZZINESS: 0
FREQUENCY: 0
SORE THROAT: 0

## 2024-08-21 NOTE — PROGRESS NOTES
Subjective   Patient ID: Lynette Severino is a 54 y.o. female.    Patient here to refill phentermine. She has lost weight. Understands the medication, risk and benefits. No uncontrolled BP. No hx stroke, MI. Is following a healthy diet with a caloric goal below 1200 daily. She is committed to exercising at least 3x a week for at least 40 minutes. She does not smoke. She has no CP, SOB or palpitations. Currently with covid, no severe symptoms.          Review of Systems   Constitutional:  Positive for fatigue. Negative for fever and unexpected weight change.   HENT:  Positive for congestion and rhinorrhea. Negative for ear pain, hearing loss, sore throat and trouble swallowing.    Eyes:  Negative for pain and visual disturbance.   Respiratory:  Negative for cough and shortness of breath.    Cardiovascular:  Negative for chest pain, palpitations and leg swelling.   Gastrointestinal:  Negative for abdominal pain, blood in stool, diarrhea, nausea and vomiting.   Genitourinary:  Negative for dysuria, frequency, hematuria and urgency.   Musculoskeletal:  Negative for joint swelling.   Skin:  Negative for pallor and rash.   Neurological:  Negative for dizziness, syncope, weakness, numbness and headaches.   Psychiatric/Behavioral:  Negative for confusion, decreased concentration, hallucinations and suicidal ideas.      There were no vitals filed for this visit.   Objective   Physical Exam  Constitutional:       Appearance: Normal appearance.      Comments: Sounds congested.   Neurological:      Mental Status: She is alert.         Assessment/Plan   There are no diagnoses linked to this encounter.

## 2024-08-21 NOTE — PATIENT INSTRUCTIONS
RF phentermine  Risk and benefit discussed  Discussed caloric limit for age and gender, [employer] Brittany recommended  Exercise at least 3x a week at least 45 min each session aerobically  OARRS reviewed  CSA and UDS current/updated  Follow up 1 month  Sx care for covid.

## 2024-09-16 ENCOUNTER — HOSPITAL ENCOUNTER (EMERGENCY)
Facility: HOSPITAL | Age: 54
Discharge: HOME | End: 2024-09-17
Attending: EMERGENCY MEDICINE
Payer: COMMERCIAL

## 2024-09-16 ENCOUNTER — APPOINTMENT (OUTPATIENT)
Dept: RADIOLOGY | Facility: HOSPITAL | Age: 54
End: 2024-09-16
Payer: COMMERCIAL

## 2024-09-16 DIAGNOSIS — K52.9 COLITIS: ICD-10-CM

## 2024-09-16 DIAGNOSIS — R10.84 GENERALIZED ABDOMINAL PAIN: Primary | ICD-10-CM

## 2024-09-16 DIAGNOSIS — E86.0 DEHYDRATION: ICD-10-CM

## 2024-09-16 LAB
ALBUMIN SERPL BCP-MCNC: 3.9 G/DL (ref 3.4–5)
ALP SERPL-CCNC: 58 U/L (ref 33–110)
ALT SERPL W P-5'-P-CCNC: 6 U/L (ref 7–45)
ANION GAP SERPL CALC-SCNC: 14 MMOL/L (ref 10–20)
APPEARANCE UR: CLEAR
AST SERPL W P-5'-P-CCNC: 9 U/L (ref 9–39)
BACTERIA #/AREA URNS AUTO: ABNORMAL /HPF
BASOPHILS # BLD AUTO: 0.03 X10*3/UL (ref 0–0.1)
BASOPHILS NFR BLD AUTO: 0.2 %
BILIRUB SERPL-MCNC: 0.4 MG/DL (ref 0–1.2)
BILIRUB UR STRIP.AUTO-MCNC: NEGATIVE MG/DL
BUN SERPL-MCNC: 8 MG/DL (ref 6–23)
CALCIUM SERPL-MCNC: 8.2 MG/DL (ref 8.6–10.3)
CHLORIDE SERPL-SCNC: 100 MMOL/L (ref 98–107)
CO2 SERPL-SCNC: 23 MMOL/L (ref 21–32)
COLOR UR: ABNORMAL
CREAT SERPL-MCNC: 0.66 MG/DL (ref 0.5–1.05)
EGFRCR SERPLBLD CKD-EPI 2021: >90 ML/MIN/1.73M*2
EOSINOPHIL # BLD AUTO: 0.01 X10*3/UL (ref 0–0.7)
EOSINOPHIL NFR BLD AUTO: 0.1 %
ERYTHROCYTE [DISTWIDTH] IN BLOOD BY AUTOMATED COUNT: 11.9 % (ref 11.5–14.5)
GLUCOSE SERPL-MCNC: 93 MG/DL (ref 74–99)
GLUCOSE UR STRIP.AUTO-MCNC: NORMAL MG/DL
HCT VFR BLD AUTO: 38 % (ref 36–46)
HGB BLD-MCNC: 13.1 G/DL (ref 12–16)
IMM GRANULOCYTES # BLD AUTO: 0.04 X10*3/UL (ref 0–0.7)
IMM GRANULOCYTES NFR BLD AUTO: 0.3 % (ref 0–0.9)
KETONES UR STRIP.AUTO-MCNC: ABNORMAL MG/DL
LACTATE SERPL-SCNC: 0.8 MMOL/L (ref 0.4–2)
LEUKOCYTE ESTERASE UR QL STRIP.AUTO: NEGATIVE
LIPASE SERPL-CCNC: 8 U/L (ref 9–82)
LYMPHOCYTES # BLD AUTO: 0.75 X10*3/UL (ref 1.2–4.8)
LYMPHOCYTES NFR BLD AUTO: 6 %
MCH RBC QN AUTO: 30.3 PG (ref 26–34)
MCHC RBC AUTO-ENTMCNC: 34.5 G/DL (ref 32–36)
MCV RBC AUTO: 88 FL (ref 80–100)
MONOCYTES # BLD AUTO: 0.75 X10*3/UL (ref 0.1–1)
MONOCYTES NFR BLD AUTO: 6 %
MUCOUS THREADS #/AREA URNS AUTO: ABNORMAL /LPF
NEUTROPHILS # BLD AUTO: 10.84 X10*3/UL (ref 1.2–7.7)
NEUTROPHILS NFR BLD AUTO: 87.4 %
NITRITE UR QL STRIP.AUTO: NEGATIVE
NRBC BLD-RTO: 0 /100 WBCS (ref 0–0)
PH UR STRIP.AUTO: 5.5 [PH]
PLATELET # BLD AUTO: 257 X10*3/UL (ref 150–450)
POTASSIUM SERPL-SCNC: 3.5 MMOL/L (ref 3.5–5.3)
PROT SERPL-MCNC: 6.7 G/DL (ref 6.4–8.2)
PROT UR STRIP.AUTO-MCNC: NEGATIVE MG/DL
RBC # BLD AUTO: 4.32 X10*6/UL (ref 4–5.2)
RBC # UR STRIP.AUTO: ABNORMAL /UL
RBC #/AREA URNS AUTO: ABNORMAL /HPF
SODIUM SERPL-SCNC: 133 MMOL/L (ref 136–145)
SP GR UR STRIP.AUTO: 1.01
SQUAMOUS #/AREA URNS AUTO: ABNORMAL /HPF
UROBILINOGEN UR STRIP.AUTO-MCNC: NORMAL MG/DL
WBC # BLD AUTO: 12.4 X10*3/UL (ref 4.4–11.3)
WBC #/AREA URNS AUTO: ABNORMAL /HPF

## 2024-09-16 PROCEDURE — 83605 ASSAY OF LACTIC ACID: CPT | Performed by: PHYSICIAN ASSISTANT

## 2024-09-16 PROCEDURE — 2550000001 HC RX 255 CONTRASTS: Performed by: PHYSICIAN ASSISTANT

## 2024-09-16 PROCEDURE — 80053 COMPREHEN METABOLIC PANEL: CPT | Performed by: PHYSICIAN ASSISTANT

## 2024-09-16 PROCEDURE — 81001 URINALYSIS AUTO W/SCOPE: CPT | Performed by: PHYSICIAN ASSISTANT

## 2024-09-16 PROCEDURE — 70450 CT HEAD/BRAIN W/O DYE: CPT

## 2024-09-16 PROCEDURE — 83690 ASSAY OF LIPASE: CPT | Performed by: PHYSICIAN ASSISTANT

## 2024-09-16 PROCEDURE — 99285 EMERGENCY DEPT VISIT HI MDM: CPT

## 2024-09-16 PROCEDURE — 36415 COLL VENOUS BLD VENIPUNCTURE: CPT | Performed by: PHYSICIAN ASSISTANT

## 2024-09-16 PROCEDURE — 74177 CT ABD & PELVIS W/CONTRAST: CPT

## 2024-09-16 PROCEDURE — 85025 COMPLETE CBC W/AUTO DIFF WBC: CPT | Performed by: PHYSICIAN ASSISTANT

## 2024-09-16 PROCEDURE — 70450 CT HEAD/BRAIN W/O DYE: CPT | Performed by: RADIOLOGY

## 2024-09-16 PROCEDURE — 87636 SARSCOV2 & INF A&B AMP PRB: CPT | Performed by: PHYSICIAN ASSISTANT

## 2024-09-16 PROCEDURE — 74177 CT ABD & PELVIS W/CONTRAST: CPT | Mod: FOREIGN READ | Performed by: RADIOLOGY

## 2024-09-16 RX ORDER — ACETAMINOPHEN 325 MG/1
975 TABLET ORAL ONCE
Status: COMPLETED | OUTPATIENT
Start: 2024-09-16 | End: 2024-09-16

## 2024-09-16 ASSESSMENT — PAIN DESCRIPTION - ORIENTATION: ORIENTATION: LOWER

## 2024-09-16 ASSESSMENT — COLUMBIA-SUICIDE SEVERITY RATING SCALE - C-SSRS
1. IN THE PAST MONTH, HAVE YOU WISHED YOU WERE DEAD OR WISHED YOU COULD GO TO SLEEP AND NOT WAKE UP?: NO
2. HAVE YOU ACTUALLY HAD ANY THOUGHTS OF KILLING YOURSELF?: NO
6. HAVE YOU EVER DONE ANYTHING, STARTED TO DO ANYTHING, OR PREPARED TO DO ANYTHING TO END YOUR LIFE?: NO

## 2024-09-16 ASSESSMENT — PAIN DESCRIPTION - ONSET: ONSET: AWAKENED FROM SLEEP

## 2024-09-16 ASSESSMENT — PAIN SCALES - GENERAL
PAINLEVEL_OUTOF10: 2
PAINLEVEL_OUTOF10: 0 - NO PAIN
PAINLEVEL_OUTOF10: 4

## 2024-09-16 ASSESSMENT — PAIN - FUNCTIONAL ASSESSMENT: PAIN_FUNCTIONAL_ASSESSMENT: 0-10

## 2024-09-16 ASSESSMENT — PAIN DESCRIPTION - FREQUENCY: FREQUENCY: CONSTANT/CONTINUOUS

## 2024-09-16 ASSESSMENT — PAIN DESCRIPTION - PROGRESSION: CLINICAL_PROGRESSION: GRADUALLY WORSENING

## 2024-09-16 ASSESSMENT — PAIN DESCRIPTION - LOCATION
LOCATION: ABDOMEN
LOCATION: PELVIS

## 2024-09-16 ASSESSMENT — PAIN DESCRIPTION - PAIN TYPE: TYPE: ACUTE PAIN

## 2024-09-17 VITALS
OXYGEN SATURATION: 100 % | BODY MASS INDEX: 26.81 KG/M2 | WEIGHT: 142 LBS | TEMPERATURE: 99.1 F | SYSTOLIC BLOOD PRESSURE: 118 MMHG | HEIGHT: 61 IN | RESPIRATION RATE: 18 BRPM | DIASTOLIC BLOOD PRESSURE: 70 MMHG | HEART RATE: 91 BPM

## 2024-09-17 LAB
FLUAV RNA RESP QL NAA+PROBE: NOT DETECTED
FLUBV RNA RESP QL NAA+PROBE: NOT DETECTED
HOLD SPECIMEN: NORMAL
SARS-COV-2 RNA RESP QL NAA+PROBE: NOT DETECTED

## 2024-09-17 PROCEDURE — 2500000004 HC RX 250 GENERAL PHARMACY W/ HCPCS (ALT 636 FOR OP/ED): Performed by: EMERGENCY MEDICINE

## 2024-09-17 PROCEDURE — 96360 HYDRATION IV INFUSION INIT: CPT

## 2024-09-17 PROCEDURE — 96361 HYDRATE IV INFUSION ADD-ON: CPT

## 2024-09-17 NOTE — ED PROVIDER NOTES
The patient was seen by the midlevel/resident.  I have personally saw the patient and made/approved the management plan and take responsibility for the patient management.  I reviewed the EKG's (when done) and agree with the interpretation.  I have seen and examined the patient; agree with the workup, evaluation, MDM, and diagnosis.  The care plan has been discussed with the midlevel/resident; I have reviewed the note and agree with the documented findings.     Patient presents with multiple complaints was worked up in ED I talked to her about the findings.  She appears to have a little colitis and some dehydration.  I do not believe she requires antibiotics at this time.  Clinically suspect this is a viral illness.  We will give her additional fluids and then discharge.  Diagnoses as of 09/18/24 1819   Generalized abdominal pain   Colitis   Dehydration     MD Steven Navarrete MD  09/17/24 0134       Steven Buitrago MD  09/18/24 1819

## 2024-09-17 NOTE — ED PROVIDER NOTES
HPI   Chief Complaint   Patient presents with    Back Pain    Abdominal Pain    Neck Pain    Dizziness    Fever     Pt states she had a 100.4 fever at home        History of present illness:  54-year-old female presents the emergency room for complaints of multiple complaints.  The patient states that couple days ago she began developing diffuse abdominal cramping she states the pain is more in her right lower side now compared to where it was just even yesterday.  She states the pain seems to go into her back slightly though as well and she states she has been very nauseous and she states that she has also vomited once.  She states there today though she began developing vertigo and states that her vision has been intact but the room is been spinning about her.  She states she has never had this before and denies any falls or injuries.  She denies any ear pain or drainage out of her ear.  She denies any other symptoms at this time.  She has a past medical history of temporal lobe epilepsy.    Social history: Negative for alcohol and drug use.    Review of systems:   Gen.: No weight loss, fatigue, anorexia, insomnia, fever.   Eyes: No vision loss, double vision, drainage, eye pain.   ENT: No pharyngitis, dry mouth.   Cardiac: No chest pain, palpitations, syncope, near syncope.   Pulmonary: No shortness of breath, cough, hemoptysis.   Heme/lymph: No swollen glands, fever, bleeding.   GI: No change in bowel habits, melena, hematemesis, hematochezia, diarrhea.   : No discharge, dysuria, frequency, urgency, hematuria.   Musculoskeletal: No limb pain, joint pain, joint swelling.   Skin: No rashes.   Review of systems is otherwise negative unless stated above or in history of present illness.      Physical exam:  General: Vitals noted, no distress. Afebrile.   EENT: TMs clear. Posterior oropharynx unremarkable.   Cardiac: Regular, rate, rhythm, no murmur.   Pulmonary: Lungs clear bilaterally with good aeration. No  adventitious breath sounds.   Abdomen: Soft, nonsurgical. No peritoneal signs. Normoactive bowel sounds.  Tenderness to palpation of the right lower side, negative Rovsing sign  Extremities: No peripheral edema.   Skin: No rash.   Neuro: No focal neurologic deficits, NIH score of 0.,  Test of skew was negative      Medical decision making:   Testing: CBC CMP lipase lactate urinalysis, CT scan pelvis with contrast, CT scan of the head without contrast: White count was 12.4 lactate 0.8 CT scan of head without contrast showed no acute findings at this time, CT scan pelvis is pending at this time  Treatment: Antivert and IV fluids given  Reevaluation:   Plan: 54-year-old female presents the emergency room for complaints of multiple complaints.  The patient states that couple days ago she began developing diffuse abdominal cramping she states the pain is more in her right lower side now compared to where it was just even yesterday.  She states the pain seems to go into her back slightly though as well and she states she has been very nauseous and she states that she has also vomited once.  She states there today though she began developing vertigo and states that her vision has been intact but the room is been spinning about her.  She states she has never had this before and denies any falls or injuries.  She denies any ear pain or drainage out of her ear.  She denies any other symptoms at this time.  She has a past medical history of temporal lobe epilepsy. Neuro: No focal neurologic deficits, NIH score of 0.,  Test of skew was negative, Abdomen: Soft, nonsurgical. No peritoneal signs. Normoactive bowel sounds.  Tenderness to palpation of the right lower side, negative Rovsing sign.  I explained to the patient the test results so far and that be handing over care to the oncoming attending physician at this time.  Impression:   1.  Vertigo  2.  Abdominal pain          History provided by:  Patient    used: No            Patient History   Past Medical History:   Diagnosis Date    Acute exacerbation of chronic low back pain 2024    Encounter for full-term uncomplicated delivery ([facility])      (spontaneous vaginal delivery)    Localization-related (focal) (partial) symptomatic epilepsy and epileptic syndromes with simple partial seizures, not intractable, without status epilepticus ([facility])     Temporal lobe epilepsy    Other specified diseases of jaws     Root canal space perforation    Personal history of other diseases of the female genital tract     History of ovarian cyst    Varicella without complication     Varicella without complication     Past Surgical History:   Procedure Laterality Date     SECTION, LOW TRANSVERSE  2018     Section Low Transverse    LAPAROSCOPY DIAGNOSTIC / BIOPSY / ASPIRATION / LYSIS  2018    Exploratory Laparoscopy    MOUTH SURGERY  2018    Oral Surgery Tooth Extraction     No family history on file.  Social History     Tobacco Use    Smoking status: Never    Smokeless tobacco: Never   Substance Use Topics    Alcohol use: Not on file    Drug use: Not on file       Physical Exam   ED Triage Vitals [24]   Temperature Heart Rate Respirations BP   37.2 °C (99 °F) (!) 113 18 112/76      Pulse Ox Temp Source Heart Rate Source Patient Position   96 % Skin Monitor Sitting      BP Location FiO2 (%)     Left arm --       Physical Exam      ED Course & MDM   Diagnoses as of 24   Generalized abdominal pain   Colitis   Dehydration                 No data recorded     Evelina Coma Scale Score: 15 (245 : Marita Duran RN)       NIH Stroke Scale: 0 (24 : Guero Early, EMT)                   Medical Decision Making      Procedure  Procedures     Ha Dewitt PA-C  24

## 2024-09-23 ENCOUNTER — PATIENT OUTREACH (OUTPATIENT)
Dept: PRIMARY CARE | Facility: CLINIC | Age: 54
End: 2024-09-23
Payer: COMMERCIAL

## 2024-09-23 NOTE — PROGRESS NOTES
Discharge Facility:[facility]  Discharge Diagnosis:Dizziness  Admission Date:09/18/24  Discharge Date: 09/22/24    PCP Appointment Date:10/02/24  Specialist Appointment Date:   Hospital Encounter and Summary Linked: Yes  See discharge assessment below for further details  Engagement  Call Start Time: 0926 (9/23/2024  9:26 AM)    Medications  Medications reviewed with patient/caregiver?: Yes (zofran 4mg zithromax 500mg antivert 25 prednisone 10mg step down dosage) (9/23/2024  9:26 AM)  Is the patient having any side effects they believe may be caused by any medication additions or changes?: No (9/23/2024  9:26 AM)  Does the patient have all medications ordered at discharge?: Yes (9/23/2024  9:26 AM)  Is the patient taking all medications as directed (includes completed medication regime)?: Yes (9/23/2024  9:26 AM)    Appointments  Does the patient have a primary care provider?: Yes (9/23/2024  9:26 AM)  Care Management Interventions: Verified appointment date/time/provider (9/23/2024  9:26 AM)    Self Management  Has home health visited the patient within 72 hours of discharge?: Not applicable (9/23/2024  9:26 AM)  Has all Durable Medical Equipment (DME) been delivered?: No (9/23/2024  9:26 AM)    Patient Teaching  Does the patient have access to their discharge instructions?: Yes (9/23/2024  9:26 AM)  Care Management Interventions: Reviewed instructions with patient (9/23/2024  9:26 AM)  What is the patient's perception of their health status since discharge?: Improving (still a little dizziness and stomach upset) (9/23/2024  9:26 AM)    Wrap Up  Call End Time: 0935 (9/23/2024  9:26 AM)

## 2024-09-30 ENCOUNTER — TELEPHONE (OUTPATIENT)
Dept: OBSTETRICS AND GYNECOLOGY | Facility: CLINIC | Age: 54
End: 2024-09-30
Payer: COMMERCIAL

## 2024-09-30 DIAGNOSIS — Z79.890 HORMONE REPLACEMENT THERAPY: ICD-10-CM

## 2024-09-30 DIAGNOSIS — Z12.31 ENCOUNTER FOR SCREENING MAMMOGRAM FOR BREAST CANCER: Primary | ICD-10-CM

## 2024-09-30 DIAGNOSIS — R92.333 HETEROGENEOUSLY DENSE TISSUE OF BOTH BREASTS ON MAMMOGRAPHY: ICD-10-CM

## 2024-09-30 RX ORDER — ESTRADIOL AND NORETHINDRONE ACETATE 1; .5 MG/1; MG/1
1 TABLET ORAL DAILY
Qty: 84 TABLET | Refills: 0 | Status: SHIPPED | OUTPATIENT
Start: 2024-09-30

## 2024-09-30 RX ORDER — ESTRADIOL AND NORETHINDRONE ACETATE 1; .5 MG/1; MG/1
1 TABLET ORAL DAILY
Qty: 84 TABLET | Refills: 0 | OUTPATIENT
Start: 2024-09-30

## 2024-09-30 NOTE — PROGRESS NOTES
Pt called office requesting refill on HRT, mamm order, fast breast MRI order.  On review of chart had ER visit this month for shortness of breast. Negative CT for PE. Pt denies any continued SOB. We discussed no HRT if ever a history of PE or DVT. Pt denies.

## 2024-10-02 ENCOUNTER — APPOINTMENT (OUTPATIENT)
Dept: PRIMARY CARE | Facility: CLINIC | Age: 54
End: 2024-10-02
Payer: COMMERCIAL

## 2024-10-02 DIAGNOSIS — E66.3 OVERWEIGHT: ICD-10-CM

## 2024-10-02 DIAGNOSIS — A04.5 CAMPYLOBACTER INTESTINAL INFECTION: Primary | ICD-10-CM

## 2024-10-02 PROCEDURE — 99213 OFFICE O/P EST LOW 20 MIN: CPT | Performed by: FAMILY MEDICINE

## 2024-10-02 RX ORDER — ONDANSETRON 4 MG/1
4 TABLET, FILM COATED ORAL EVERY 6 HOURS PRN
COMMUNITY
Start: 2024-09-21

## 2024-10-02 RX ORDER — LIDOCAINE 4 G/100G
1 PATCH TOPICAL
COMMUNITY
Start: 2024-09-21

## 2024-10-02 RX ORDER — MECLIZINE HYDROCHLORIDE 25 MG/1
25 TABLET ORAL EVERY 8 HOURS PRN
COMMUNITY
Start: 2024-09-21

## 2024-10-02 NOTE — PROGRESS NOTES
Subjective   Patient ID: Lynette Severino is a 54 y.o. female.    Patient comes in today for few reasons.  She is on phentermine and has been doing very well.  She is losing weight.  We will make this her phentermine visit since she is here.  She also is here for hospital follow-up.  She caught Campylobacter somehow.  She thinks it was while she was preparing raw chicken however she does not eat raw meat or any meat for that matter.  She was in the hospital.  She was given fluids and treated with antibiotics.  She is feeling back to normal, she never had diarrhea if anything she had constipation.  She had abdominal cramping.  No blood in the stool.  Energy level is getting better.        Review of Systems   Constitutional:  Positive for fatigue. Negative for fever and unexpected weight change.   HENT:  Negative for congestion, ear pain, hearing loss, sore throat and trouble swallowing.    Eyes:  Negative for pain and visual disturbance.   Respiratory:  Negative for cough and shortness of breath.    Cardiovascular:  Negative for chest pain, palpitations and leg swelling.   Gastrointestinal:  Positive for abdominal pain. Negative for blood in stool, diarrhea, nausea and vomiting.   Genitourinary:  Negative for dysuria, frequency, hematuria and urgency.   Musculoskeletal:  Negative for joint swelling.   Skin:  Negative for pallor and rash.   Neurological:  Negative for dizziness, syncope, weakness, numbness and headaches.   Psychiatric/Behavioral:  Negative for confusion, decreased concentration, hallucinations and suicidal ideas.      There were no vitals filed for this visit.   Objective   Physical Exam  Constitutional:       Comments: She appears fatigued.   Cardiovascular:      Rate and Rhythm: Normal rate and regular rhythm.      Heart sounds: Normal heart sounds.   Pulmonary:      Effort: Pulmonary effort is normal.      Breath sounds: Normal breath sounds.   Abdominal:      Comments: Mild diffuse tenderness  but no guarding, mass or hepatosplenomegaly   Musculoskeletal:      Cervical back: Neck supple.      Right lower leg: No edema.      Left lower leg: No edema.   Skin:     General: Skin is warm and dry.   Neurological:      General: No focal deficit present.      Mental Status: She is alert.   Psychiatric:         Mood and Affect: Mood normal.         Speech: Speech normal.         Behavior: Behavior normal.         Cognition and Memory: Cognition normal.         Assessment/Plan   There are no diagnoses linked to this encounter.

## 2024-10-03 ENCOUNTER — PATIENT OUTREACH (OUTPATIENT)
Dept: PRIMARY CARE | Facility: CLINIC | Age: 54
End: 2024-10-03
Payer: COMMERCIAL

## 2024-10-03 ENCOUNTER — HOSPITAL ENCOUNTER (OUTPATIENT)
Dept: RADIOLOGY | Facility: HOSPITAL | Age: 54
Discharge: HOME | End: 2024-10-03
Payer: COMMERCIAL

## 2024-10-03 VITALS — WEIGHT: 140 LBS | BODY MASS INDEX: 26.43 KG/M2 | HEIGHT: 61 IN

## 2024-10-03 DIAGNOSIS — Z12.31 ENCOUNTER FOR SCREENING MAMMOGRAM FOR BREAST CANCER: ICD-10-CM

## 2024-10-03 PROCEDURE — 77067 SCR MAMMO BI INCL CAD: CPT

## 2024-10-03 ASSESSMENT — PATIENT HEALTH QUESTIONNAIRE - PHQ9
2. FEELING DOWN, DEPRESSED OR HOPELESS: NOT AT ALL
1. LITTLE INTEREST OR PLEASURE IN DOING THINGS: NOT AT ALL
SUM OF ALL RESPONSES TO PHQ9 QUESTIONS 1 AND 2: 0

## 2024-10-03 NOTE — PROGRESS NOTES
Call regarding appt. with PCP on 10/02/23 after hospitalization.  At time of outreach call the patient feels as if their condition has improved  since last visit.  Reviewed the PCP appointment with the pt and addressed any questions or concerns.

## 2024-10-04 ASSESSMENT — ENCOUNTER SYMPTOMS
BLOOD IN STOOL: 0
DIARRHEA: 0
PALPITATIONS: 0
VOMITING: 0
EYE PAIN: 0
SHORTNESS OF BREATH: 0
JOINT SWELLING: 0
DIZZINESS: 0
NAUSEA: 0
ABDOMINAL PAIN: 1
FATIGUE: 1
HEMATURIA: 0
DECREASED CONCENTRATION: 0
UNEXPECTED WEIGHT CHANGE: 0
COUGH: 0
FEVER: 0
HEADACHES: 0
DYSURIA: 0
HALLUCINATIONS: 0
WEAKNESS: 0
CONFUSION: 0
TROUBLE SWALLOWING: 0
SORE THROAT: 0
NUMBNESS: 0
FREQUENCY: 0

## 2024-10-04 NOTE — PATIENT INSTRUCTIONS
Doing better by her report.  She can continue the phentermine when she feels she is able.  Make sure she drinks lots of water.  Let me know if abdominal symptoms return.  Keep the diet bland and avoid dairy for now.   ,

## 2024-10-09 ENCOUNTER — TELEPHONE (OUTPATIENT)
Dept: OBSTETRICS AND GYNECOLOGY | Facility: CLINIC | Age: 54
End: 2024-10-09

## 2024-10-09 DIAGNOSIS — E66.3 OVERWEIGHT (BMI 25.0-29.9): ICD-10-CM

## 2024-10-09 DIAGNOSIS — R92.8 ABNORMALITY OF LEFT BREAST ON SCREENING MAMMOGRAM: Primary | ICD-10-CM

## 2024-10-09 NOTE — TELEPHONE ENCOUNTER
Patient needing phentermine refill. Was too soon for refill at appt due to travel. Pt current weight is 140lb

## 2024-10-10 ENCOUNTER — HOSPITAL ENCOUNTER (OUTPATIENT)
Dept: RADIOLOGY | Facility: HOSPITAL | Age: 54
Discharge: HOME | End: 2024-10-10
Payer: COMMERCIAL

## 2024-10-10 DIAGNOSIS — R92.8 ABNORMALITY OF LEFT BREAST ON SCREENING MAMMOGRAM: ICD-10-CM

## 2024-10-10 PROCEDURE — 77061 BREAST TOMOSYNTHESIS UNI: CPT | Mod: LT

## 2024-10-10 RX ORDER — PHENTERMINE HYDROCHLORIDE 37.5 MG/1
37.5 TABLET ORAL
Qty: 30 TABLET | Refills: 0 | Status: SHIPPED | OUTPATIENT
Start: 2024-10-10 | End: 2024-11-09

## 2024-10-16 ENCOUNTER — APPOINTMENT (OUTPATIENT)
Dept: PRIMARY CARE | Facility: CLINIC | Age: 54
End: 2024-10-16
Payer: COMMERCIAL

## 2024-10-16 ENCOUNTER — OFFICE VISIT (OUTPATIENT)
Dept: PRIMARY CARE | Facility: CLINIC | Age: 54
End: 2024-10-16
Payer: COMMERCIAL

## 2024-10-16 VITALS
SYSTOLIC BLOOD PRESSURE: 110 MMHG | WEIGHT: 140 LBS | OXYGEN SATURATION: 96 % | HEART RATE: 86 BPM | DIASTOLIC BLOOD PRESSURE: 70 MMHG | HEIGHT: 62 IN | BODY MASS INDEX: 25.76 KG/M2 | TEMPERATURE: 97.6 F

## 2024-10-16 DIAGNOSIS — I80.8 SUPERFICIAL THROMBOPHLEBITIS OF RIGHT UPPER EXTREMITY: Primary | ICD-10-CM

## 2024-10-16 PROCEDURE — 3008F BODY MASS INDEX DOCD: CPT | Performed by: FAMILY MEDICINE

## 2024-10-16 PROCEDURE — 1036F TOBACCO NON-USER: CPT | Performed by: FAMILY MEDICINE

## 2024-10-16 PROCEDURE — 99213 OFFICE O/P EST LOW 20 MIN: CPT | Performed by: FAMILY MEDICINE

## 2024-10-16 ASSESSMENT — ENCOUNTER SYMPTOMS
VOMITING: 0
TROUBLE SWALLOWING: 0
WEAKNESS: 0
BLOOD IN STOOL: 0
DIARRHEA: 0
NUMBNESS: 0
DIZZINESS: 0
HEADACHES: 0
HALLUCINATIONS: 0
FEVER: 0
DECREASED CONCENTRATION: 0
SHORTNESS OF BREATH: 0
EYE PAIN: 0
FATIGUE: 0
CONFUSION: 0
ABDOMINAL PAIN: 0
DYSURIA: 0
PALPITATIONS: 0
UNEXPECTED WEIGHT CHANGE: 0
JOINT SWELLING: 0
HEMATURIA: 0
COUGH: 0
NAUSEA: 0
SORE THROAT: 0
FREQUENCY: 0

## 2024-10-16 NOTE — PROGRESS NOTES
Subjective   Patient ID: Lynette Severino is a 54 y.o. female.    Patient was given an IV in her right wrist and now she has ropey veins at the area of the site of the IV.  There is minimal redness.  There is been no fever.  No swelling of the extremity. No fever.  No history of blood clot, DVT or miscarriages.        Review of Systems   Constitutional:  Negative for fatigue, fever and unexpected weight change.   HENT:  Negative for congestion, ear pain, hearing loss, sore throat and trouble swallowing.    Eyes:  Negative for pain and visual disturbance.   Respiratory:  Negative for cough and shortness of breath.    Cardiovascular:  Negative for chest pain, palpitations and leg swelling.   Gastrointestinal:  Negative for abdominal pain, blood in stool, diarrhea, nausea and vomiting.   Genitourinary:  Negative for dysuria, frequency, hematuria and urgency.   Musculoskeletal:  Negative for joint swelling.   Skin:  Negative for pallor and rash.   Neurological:  Negative for dizziness, syncope, weakness, numbness and headaches.   Psychiatric/Behavioral:  Negative for confusion, decreased concentration, hallucinations and suicidal ideas.      Vitals:    10/16/24 0826   BP: 110/70   Pulse: 86   Temp: 36.4 °C (97.6 °F)   SpO2: 96%      Objective   Physical Exam  Constitutional:       Appearance: Normal appearance.   Musculoskeletal:      Comments: Right upper extremity appears normal.  There is prominence of the veins on the right wrist.  They are firm to palpation, there is no induration, tenderness in the muscle area, pulses are intact.  There is no erythema.   Neurological:      Mental Status: She is alert.         Assessment/Plan   There are no diagnoses linked to this encounter.

## 2024-10-16 NOTE — PATIENT INSTRUCTIONS
Symptomatic care discussed.  She is taking an aspirin a day 325 mg, apply heat and it should resolve on its own.  She is not high risk for DVT.  I feel it is safe to continue postmenopausal hormone.

## 2024-10-17 ENCOUNTER — HOSPITAL ENCOUNTER (OUTPATIENT)
Dept: RADIOLOGY | Facility: HOSPITAL | Age: 54
Discharge: HOME | End: 2024-10-17

## 2024-10-17 DIAGNOSIS — R92.333 HETEROGENEOUSLY DENSE TISSUE OF BOTH BREASTS ON MAMMOGRAPHY: ICD-10-CM

## 2024-10-17 PROCEDURE — 2550000001 HC RX 255 CONTRASTS: Performed by: OBSTETRICS & GYNECOLOGY

## 2024-10-17 PROCEDURE — 6100000003 BI MR BREAST BILATERAL WITH CONTRAST FAST SCREENING SELF PAY

## 2024-10-17 PROCEDURE — A9575 INJ GADOTERATE MEGLUMI 0.1ML: HCPCS | Performed by: OBSTETRICS & GYNECOLOGY

## 2024-10-17 RX ORDER — GADOTERATE MEGLUMINE 376.9 MG/ML
20 INJECTION INTRAVENOUS
Status: COMPLETED | OUTPATIENT
Start: 2024-10-17 | End: 2024-10-17

## 2024-10-21 ENCOUNTER — PATIENT OUTREACH (OUTPATIENT)
Dept: PRIMARY CARE | Facility: CLINIC | Age: 54
End: 2024-10-21
Payer: COMMERCIAL

## 2024-10-29 ENCOUNTER — APPOINTMENT (OUTPATIENT)
Dept: RADIOLOGY | Facility: CLINIC | Age: 54
End: 2024-10-29
Payer: COMMERCIAL

## 2024-10-31 ENCOUNTER — APPOINTMENT (OUTPATIENT)
Dept: PRIMARY CARE | Facility: CLINIC | Age: 54
End: 2024-10-31
Payer: COMMERCIAL

## 2024-10-31 VITALS — WEIGHT: 140 LBS | BODY MASS INDEX: 26.45 KG/M2

## 2024-10-31 DIAGNOSIS — J01.00 ACUTE NON-RECURRENT MAXILLARY SINUSITIS: Primary | ICD-10-CM

## 2024-10-31 DIAGNOSIS — E66.3 OVERWEIGHT (BMI 25.0-29.9): ICD-10-CM

## 2024-10-31 PROCEDURE — 99213 OFFICE O/P EST LOW 20 MIN: CPT | Performed by: FAMILY MEDICINE

## 2024-10-31 RX ORDER — PHENTERMINE HYDROCHLORIDE 37.5 MG/1
37.5 TABLET ORAL
Qty: 30 TABLET | Refills: 0 | Status: SHIPPED | OUTPATIENT
Start: 2024-10-31 | End: 2024-11-30

## 2024-10-31 RX ORDER — AZITHROMYCIN 500 MG/1
500 TABLET, FILM COATED ORAL DAILY
Qty: 5 TABLET | Refills: 0 | Status: SHIPPED | OUTPATIENT
Start: 2024-10-31 | End: 2024-11-05

## 2024-11-01 ASSESSMENT — ENCOUNTER SYMPTOMS
BLOOD IN STOOL: 0
ABDOMINAL PAIN: 0
WEAKNESS: 0
CONFUSION: 0
NAUSEA: 0
FEVER: 0
SHORTNESS OF BREATH: 0
NUMBNESS: 0
SORE THROAT: 0
HALLUCINATIONS: 0
DIARRHEA: 0
FREQUENCY: 0
VOMITING: 0
DECREASED CONCENTRATION: 0
DYSURIA: 0
EYE PAIN: 0
PALPITATIONS: 0
UNEXPECTED WEIGHT CHANGE: 0
HEMATURIA: 0
FATIGUE: 0
TROUBLE SWALLOWING: 0
HEADACHES: 0
COUGH: 0
DIZZINESS: 0
JOINT SWELLING: 0

## 2024-11-27 ENCOUNTER — APPOINTMENT (OUTPATIENT)
Dept: PRIMARY CARE | Facility: CLINIC | Age: 54
End: 2024-11-27
Payer: COMMERCIAL

## 2024-11-27 VITALS — WEIGHT: 142 LBS | BODY MASS INDEX: 26.83 KG/M2

## 2024-11-27 DIAGNOSIS — E66.3 OVERWEIGHT (BMI 25.0-29.9): Primary | ICD-10-CM

## 2024-11-27 PROCEDURE — 99213 OFFICE O/P EST LOW 20 MIN: CPT | Performed by: FAMILY MEDICINE

## 2024-11-27 RX ORDER — PHENTERMINE HYDROCHLORIDE 37.5 MG/1
37.5 CAPSULE ORAL
Qty: 30 CAPSULE | Refills: 0 | Status: SHIPPED | OUTPATIENT
Start: 2024-11-27 | End: 2024-12-27

## 2024-11-27 ASSESSMENT — ENCOUNTER SYMPTOMS
SHORTNESS OF BREATH: 0
VOMITING: 0
FATIGUE: 0
DYSURIA: 0
NUMBNESS: 0
UNEXPECTED WEIGHT CHANGE: 0
HEMATURIA: 0
FREQUENCY: 0
DECREASED CONCENTRATION: 0
TROUBLE SWALLOWING: 0
CONFUSION: 0
BLOOD IN STOOL: 0
COUGH: 0
WEAKNESS: 0
HEADACHES: 0
JOINT SWELLING: 0
HALLUCINATIONS: 0
NAUSEA: 0
DIZZINESS: 0
FEVER: 0
PALPITATIONS: 0
DIARRHEA: 0
SORE THROAT: 0
ABDOMINAL PAIN: 0
EYE PAIN: 0

## 2024-11-27 NOTE — PATIENT INSTRUCTIONS
RF phentermine  Risk and benefit discussed  Discussed caloric limit for age and gender, [employer] recommended  Exercise at least 3x a week at least 45 min each session aerobically  OARRS reviewed  CSA and UDS current/updated  Follow up 1 month

## 2024-12-19 ENCOUNTER — PATIENT OUTREACH (OUTPATIENT)
Dept: PRIMARY CARE | Facility: CLINIC | Age: 54
End: 2024-12-19
Payer: COMMERCIAL

## 2024-12-20 ENCOUNTER — APPOINTMENT (OUTPATIENT)
Dept: PRIMARY CARE | Facility: CLINIC | Age: 54
End: 2024-12-20
Payer: COMMERCIAL

## 2024-12-20 VITALS — BODY MASS INDEX: 26.07 KG/M2 | WEIGHT: 138 LBS

## 2024-12-20 DIAGNOSIS — E66.3 OVERWEIGHT (BMI 25.0-29.9): ICD-10-CM

## 2024-12-20 PROCEDURE — 99213 OFFICE O/P EST LOW 20 MIN: CPT | Performed by: FAMILY MEDICINE

## 2024-12-20 RX ORDER — PHENTERMINE HYDROCHLORIDE 37.5 MG/1
37.5 CAPSULE ORAL
Qty: 30 CAPSULE | Refills: 0 | Status: SHIPPED | OUTPATIENT
Start: 2024-12-20 | End: 2025-01-19

## 2024-12-20 ASSESSMENT — ENCOUNTER SYMPTOMS
ABDOMINAL PAIN: 0
CONFUSION: 0
HEADACHES: 0
VOMITING: 0
NUMBNESS: 0
TROUBLE SWALLOWING: 0
EYE PAIN: 0
DYSURIA: 0
FEVER: 0
UNEXPECTED WEIGHT CHANGE: 0
COUGH: 0
SHORTNESS OF BREATH: 0
HEMATURIA: 0
DIARRHEA: 0
PALPITATIONS: 0
WEAKNESS: 0
DECREASED CONCENTRATION: 0
HALLUCINATIONS: 0
NAUSEA: 0
FATIGUE: 0
FREQUENCY: 0
DIZZINESS: 0
BLOOD IN STOOL: 0
SORE THROAT: 0
JOINT SWELLING: 0

## 2024-12-20 NOTE — PROGRESS NOTES
Subjective   Patient ID: Lynette Severino is a 54 y.o. female.    Patient here to refill phentermine. Body mass index is 26.07 kg/m². She has lost weight. Understands the medication, risk and benefits. No uncontrolled BP. No hx stroke, MI. Is following a healthy diet with a caloric goal below 1200 daily. She is committed to exercising at least 3x a week for at least 40 minutes. She does not smoke. She has no CP, SOB or palpitations.            Review of Systems   Constitutional:  Negative for fatigue, fever and unexpected weight change.   HENT:  Negative for congestion, ear pain, hearing loss, sore throat and trouble swallowing.    Eyes:  Negative for pain and visual disturbance.   Respiratory:  Negative for cough and shortness of breath.    Cardiovascular:  Negative for chest pain, palpitations and leg swelling.   Gastrointestinal:  Negative for abdominal pain, blood in stool, diarrhea, nausea and vomiting.   Genitourinary:  Negative for dysuria, frequency, hematuria and urgency.   Musculoskeletal:  Negative for joint swelling.   Skin:  Negative for pallor and rash.   Neurological:  Negative for dizziness, syncope, weakness, numbness and headaches.   Psychiatric/Behavioral:  Negative for confusion, decreased concentration, hallucinations and suicidal ideas.      There were no vitals filed for this visit.   Objective   Physical Exam  Constitutional:       General: She is not in acute distress.     Appearance: She is not toxic-appearing.   Neurological:      General: No focal deficit present.      Mental Status: She is alert and oriented to person, place, and time.   Psychiatric:         Mood and Affect: Mood normal.         Behavior: Behavior normal.         Thought Content: Thought content normal.         Judgment: Judgment normal.         Assessment/Plan   Diagnoses and all orders for this visit:  Overweight (BMI 25.0-29.9)  -     phentermine 37.5 mg capsule; Take 1 capsule (37.5 mg) by mouth once daily in the  morning. Take before meals. BMI 26.83. continuation of therapy

## 2025-01-03 DIAGNOSIS — Z79.890 HORMONE REPLACEMENT THERAPY: ICD-10-CM

## 2025-01-03 RX ORDER — ESTRADIOL AND NORETHINDRONE ACETATE 1; .5 MG/1; MG/1
1 TABLET ORAL DAILY
Qty: 84 TABLET | Refills: 0 | Status: SHIPPED | OUTPATIENT
Start: 2025-01-03

## 2025-01-28 ENCOUNTER — APPOINTMENT (OUTPATIENT)
Dept: PRIMARY CARE | Facility: CLINIC | Age: 55
End: 2025-01-28
Payer: COMMERCIAL

## 2025-01-28 DIAGNOSIS — E66.3 OVERWEIGHT: Primary | ICD-10-CM

## 2025-01-28 PROCEDURE — 99213 OFFICE O/P EST LOW 20 MIN: CPT | Performed by: FAMILY MEDICINE

## 2025-01-28 NOTE — PROGRESS NOTES
Subjective   Patient ID: Lynette Severino is a 54 y.o. female.    Patient here to refill phentermine. There is no height or weight on file to calculate BMI. She has lost weight. Understands the medication, risk and benefits. No uncontrolled BP. No hx stroke, MI. Is following a healthy diet with a caloric goal below 1200 daily. She is committed to exercising at least 3x a week for at least 40 minutes. She does not smoke. She has no CP, SOB or palpitations.          Review of Systems   Constitutional:  Negative for fatigue, fever and unexpected weight change.   HENT:  Negative for congestion, ear pain, hearing loss, sore throat and trouble swallowing.    Eyes:  Negative for pain and visual disturbance.   Respiratory:  Negative for cough and shortness of breath.    Cardiovascular:  Negative for chest pain, palpitations and leg swelling.   Gastrointestinal:  Negative for abdominal pain, blood in stool, diarrhea, nausea and vomiting.   Genitourinary:  Negative for dysuria, frequency, hematuria and urgency.   Musculoskeletal:  Negative for joint swelling.   Skin:  Negative for pallor and rash.   Neurological:  Negative for dizziness, syncope, weakness, numbness and headaches.   Psychiatric/Behavioral:  Negative for confusion, decreased concentration, hallucinations and suicidal ideas.      There were no vitals filed for this visit.   Objective   Physical Exam  Constitutional:       General: She is not in acute distress.     Appearance: She is not toxic-appearing.   Neurological:      General: No focal deficit present.      Mental Status: She is alert and oriented to person, place, and time.   Psychiatric:         Mood and Affect: Mood normal.         Behavior: Behavior normal.         Thought Content: Thought content normal.         Judgment: Judgment normal.         @lab@    Assessment/Plan   There are no diagnoses linked to this encounter.

## 2025-01-29 ASSESSMENT — ENCOUNTER SYMPTOMS
CONFUSION: 0
DYSURIA: 0
WEAKNESS: 0
JOINT SWELLING: 0
HEADACHES: 0
NUMBNESS: 0
BLOOD IN STOOL: 0
HALLUCINATIONS: 0
DIARRHEA: 0
VOMITING: 0
SHORTNESS OF BREATH: 0
FEVER: 0
TROUBLE SWALLOWING: 0
FREQUENCY: 0
DIZZINESS: 0
PALPITATIONS: 0
SORE THROAT: 0
EYE PAIN: 0
DECREASED CONCENTRATION: 0
HEMATURIA: 0
FATIGUE: 0
NAUSEA: 0
COUGH: 0
ABDOMINAL PAIN: 0
UNEXPECTED WEIGHT CHANGE: 0

## 2025-01-30 NOTE — PATIENT INSTRUCTIONS
RF phentermine  Risk and benefit discussed  Discussed caloric limit for age and gender, [employer] recommended  Exercise at least 3x a week at least 45 min each session aerobically  OARRS reviewed  CSA and UDS current/updated  Follow up 1 month

## 2025-02-05 DIAGNOSIS — E66.3 OVERWEIGHT (BMI 25.0-29.9): ICD-10-CM

## 2025-02-05 RX ORDER — PHENTERMINE HYDROCHLORIDE 37.5 MG/1
37.5 CAPSULE ORAL
Qty: 30 CAPSULE | Refills: 0 | Status: SHIPPED | OUTPATIENT
Start: 2025-02-05 | End: 2025-03-07

## 2025-02-12 NOTE — PROGRESS NOTES
ASSESSMENT/PLAN    1. Encounter for well woman exam with routine gynecological exam (Primary)  Routine well woman visit.   Your exam was normal today.   A pap test was not done. Last pap  was normal and HPV negative. Next pap in .    2. Encounter for screening mammogram for breast cancer  Your clinical breast exam was normal.   A screening mammogram order has been placed for 10/2025.     If RLQ pain persists for additional 2 weeks, recommend call me to order a pelvic ultrasound.       -----------------------------------------------------------------------------------------------------------    SUBJECTIVE    PAP 22 NEG, HPV NEG  MAMMO 10-3-2024  L BREAST DX MAMM 10-  BREAST MRI 10-  DEXA 2023 T=-.9  COLON 2024 NL    HPI    53 yo  here for her well woman exam.   Last visit for RA 2023  Notes RLQ discomfort on and off this past week. No bowel changes. Notes she was very sick with a GI infection, campylobacter, 2024, had colonoscopy that was normal.   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 Continues on combined HRT. No bleeding. We talked about the timing of coming off of HRT. Pt has done well on this form of HRT and wants to continue. She talked about the option of changing to estrogen patch and oral progesterone. Pt declines.   Denies any intervening medical or surgical issues. Up to date with PCP visits,   Mother h/o osteoporosis. Mother passed away this past year.  , nonsmoker, no ETOH.     REVIEW OF SYSTEMS    Constitutional: no recent weight gain, no fatigue.   ENT: no hearing loss.  Cardiovascular: no chest pain, no palpitations.  Respiratory: no shortness of breath.  Gastrointestinal: no abdominal pain, no constipation, no nausea, no diarrhea.  Musculoskeletal: no back pain.  Lymphatic: no swollen glands.  Genitourinary: no pelvic pain, no urinary urgency, no urinary incontinence, no change in urinary frequency, no vaginal dryness, no vaginal itching,  no vaginal  "discharge, no unexplained vaginal bleeding, no lesion/sore.   Breasts: no breast pain, no nipple discharge, no breast lump.   Neurological: no headache, no numbness, no dizziness.   Psychiatric: no sleep disturbances, no anxiety, no depression.   Endocrine: no hot flashes, no loss of hair, no hirsutism.       OBJECTIVE    /78   Ht 1.549 m (5' 1\")   Wt 64.9 kg (143 lb)   BMI 27.02 kg/m²      Physical Exam     Constitutional: Alert and in no acute distress. Well developed, well nourished   Head and Face: Head and face: normal   Eyes: Normal external exam - nonicteric sclera.  Ears, Nose, Mouth, and Throat: External inspection of ears and nose: normal   Neck: no neck asymmetry. Supple and thyroid not enlarged and there were no palpable thyroid nodules   Cardiovascular: Heart rate and rhythm were normal  Pulmonary: No respiratory distress and clear bilateral breath sounds   Chest: Breasts: normal appearance, no nipple discharge, no skin changes. Palpation of breasts and axillae: no palpable mass, no axillary lymphadenopathy   Abdomen: soft nontender; no abdominal mass palpated, no organomegaly and no hernias   Genitourinary: external genitalia: normal appearing vulva and labia without lesions. No inguinal lymphadenopathy,    Urethra: normal.   Bladder: normal on palpation.   Perianal area: normal   Vagina: normal, without significant discharge, no lesions.  Cervix: Normal appearing without lesions.  Uterus: Normal, mobile, nontender.  Right and left adnexa/parametria: Normal. No adnexal masses or tenderness appreciated RLQ.   Skin: normal skin color and pigmentation, normal skin turgor, and no rash  Psychiatric: alert and oriented x 3., affect normal to patient baseline and mood: appropriate        Patricia Smith MD    "

## 2025-02-13 ENCOUNTER — APPOINTMENT (OUTPATIENT)
Dept: OBSTETRICS AND GYNECOLOGY | Facility: CLINIC | Age: 55
End: 2025-02-13
Payer: COMMERCIAL

## 2025-02-13 VITALS
DIASTOLIC BLOOD PRESSURE: 78 MMHG | HEIGHT: 61 IN | WEIGHT: 143 LBS | BODY MASS INDEX: 27 KG/M2 | SYSTOLIC BLOOD PRESSURE: 120 MMHG

## 2025-02-13 DIAGNOSIS — Z01.419 ENCOUNTER FOR WELL WOMAN EXAM WITH ROUTINE GYNECOLOGICAL EXAM: Primary | ICD-10-CM

## 2025-02-13 DIAGNOSIS — Z12.31 ENCOUNTER FOR SCREENING MAMMOGRAM FOR BREAST CANCER: ICD-10-CM

## 2025-02-13 DIAGNOSIS — Z79.890 HORMONE REPLACEMENT THERAPY: ICD-10-CM

## 2025-02-13 PROCEDURE — 3008F BODY MASS INDEX DOCD: CPT | Performed by: OBSTETRICS & GYNECOLOGY

## 2025-02-13 PROCEDURE — 1036F TOBACCO NON-USER: CPT | Performed by: OBSTETRICS & GYNECOLOGY

## 2025-02-13 PROCEDURE — 99396 PREV VISIT EST AGE 40-64: CPT | Performed by: OBSTETRICS & GYNECOLOGY

## 2025-02-13 RX ORDER — ESTRADIOL AND NORETHINDRONE ACETATE 1; .5 MG/1; MG/1
1 TABLET ORAL DAILY
Qty: 84 TABLET | Refills: 3 | Status: SHIPPED | OUTPATIENT
Start: 2025-02-13

## 2025-02-15 DIAGNOSIS — M25.50 ARTHRALGIA OF MULTIPLE SITES: ICD-10-CM

## 2025-02-17 RX ORDER — CELECOXIB 200 MG/1
CAPSULE ORAL
Qty: 90 CAPSULE | Refills: 1 | Status: SHIPPED | OUTPATIENT
Start: 2025-02-17

## 2025-02-25 ENCOUNTER — APPOINTMENT (OUTPATIENT)
Dept: PRIMARY CARE | Facility: CLINIC | Age: 55
End: 2025-02-25
Payer: COMMERCIAL

## 2025-02-25 DIAGNOSIS — E66.3 OVERWEIGHT (BMI 25.0-29.9): ICD-10-CM

## 2025-02-25 PROCEDURE — 99213 OFFICE O/P EST LOW 20 MIN: CPT | Performed by: FAMILY MEDICINE

## 2025-02-25 RX ORDER — PHENTERMINE HYDROCHLORIDE 37.5 MG/1
37.5 CAPSULE ORAL
Qty: 30 CAPSULE | Refills: 0 | Status: SHIPPED | OUTPATIENT
Start: 2025-03-05 | End: 2025-04-04

## 2025-03-03 ASSESSMENT — ENCOUNTER SYMPTOMS
NAUSEA: 0
PALPITATIONS: 0
WEAKNESS: 0
VOMITING: 0
UNEXPECTED WEIGHT CHANGE: 0
HALLUCINATIONS: 0
EYE PAIN: 0
ABDOMINAL PAIN: 0
COUGH: 0
DIZZINESS: 0
SORE THROAT: 0
DECREASED CONCENTRATION: 0
DIARRHEA: 0
BLOOD IN STOOL: 0
DYSURIA: 0
SHORTNESS OF BREATH: 0
TROUBLE SWALLOWING: 0
HEADACHES: 0
NUMBNESS: 0
FATIGUE: 0
FREQUENCY: 0
CONFUSION: 0
JOINT SWELLING: 0
HEMATURIA: 0
FEVER: 0

## 2025-03-03 NOTE — PROGRESS NOTES
Subjective   Patient ID: Lynette Severino is a 54 y.o. female.    Patient here to refill phentermine. There is no height or weight on file to calculate BMI. She has lost weight. Understands the medication, risk and benefits. No uncontrolled BP. No hx stroke, MI. Is following a healthy diet with a caloric goal below 1200 daily. She is committed to exercising at least 3x a week for at least 40 minutes. She does not smoke. She has no CP, SOB or palpitations.            Review of Systems   Constitutional:  Negative for fatigue, fever and unexpected weight change.   HENT:  Negative for congestion, ear pain, hearing loss, sore throat and trouble swallowing.    Eyes:  Negative for pain and visual disturbance.   Respiratory:  Negative for cough and shortness of breath.    Cardiovascular:  Negative for chest pain, palpitations and leg swelling.   Gastrointestinal:  Negative for abdominal pain, blood in stool, diarrhea, nausea and vomiting.   Genitourinary:  Negative for dysuria, frequency, hematuria and urgency.   Musculoskeletal:  Negative for joint swelling.   Skin:  Negative for pallor and rash.   Neurological:  Negative for dizziness, syncope, weakness, numbness and headaches.   Psychiatric/Behavioral:  Negative for confusion, decreased concentration, hallucinations and suicidal ideas.      There were no vitals filed for this visit.   There is no height or weight on file to calculate BMI.  Objective   Physical Exam  Constitutional:       General: She is not in acute distress.     Appearance: She is not toxic-appearing.   Neurological:      General: No focal deficit present.      Mental Status: She is alert and oriented to person, place, and time.   Psychiatric:         Mood and Affect: Mood normal.         Behavior: Behavior normal.         Thought Content: Thought content normal.         Judgment: Judgment normal.         Last Labs:     CMP:   Lab Results   Component Value Date    CALCIUM 8.2 (L) 09/16/2024    CALCIUM  "8.8 05/29/2024    PROT 6.7 09/16/2024    PROT 6.7 05/29/2024    ALBUMIN 3.9 09/16/2024    ALBUMIN 4.3 05/29/2024    AST 9 09/16/2024    AST 12 05/29/2024    ALKPHOS 58 09/16/2024    ALKPHOS 47 05/29/2024    BILITOT 0.4 09/16/2024    BILITOT 0.5 05/29/2024     CBC:   Lab Results   Component Value Date    WBC 12.4 (H) 09/16/2024    WBC 4.6 05/29/2024    HGB 13.1 09/16/2024    HGB 13.9 05/29/2024    HCT 38.0 09/16/2024    HCT 42.1 05/29/2024    MCV 88 09/16/2024    MCV 92 05/29/2024     09/16/2024     05/29/2024     A1C:   Lab Results   Component Value Date    HGBA1C 4.9 05/29/2024    HGBA1C 5.1 03/31/2022     LIPID PANEL:   Lab Results   Component Value Date    CHOL 164 05/29/2024    CHOL 185 04/05/2022    TRIG 64 05/29/2024    TRIG 55 04/05/2022    HDL 63.8 05/29/2024    HDL 73.9 04/05/2022    CHHDL 2.6 05/29/2024    CHHDL 2.5 04/05/2022    LDLF 100 (H) 04/05/2022    VLDL 13 05/29/2024    VLDL 11 04/05/2022    NHDL 100 05/29/2024     TSH:   Lab Results   Component Value Date    TSH 0.11 (L) 05/29/2024    TSH 0.88 03/31/2022     PSA:   No results found for: \"PSA\"     Assessment/Plan   Diagnoses and all orders for this visit:  Overweight (BMI 25.0-29.9)  -     phentermine 37.5 mg capsule; Take 1 capsule (37.5 mg) by mouth once daily in the morning. Take before meals. BMI 26.83. continuation of therapy Do not fill before March 5, 2025.      "

## 2025-03-03 NOTE — PATIENT INSTRUCTIONS
RF phentermine  Risk and benefit discussed  Discussed caloric limit for age and gender, [place] Brittany recommended  Exercise at least 3x a week at least 45 min each session aerobically  OARRS reviewed  CSA and UDS current/updated  Follow up 1 month

## 2025-03-20 ENCOUNTER — APPOINTMENT (OUTPATIENT)
Dept: PRIMARY CARE | Facility: CLINIC | Age: 55
End: 2025-03-20
Payer: COMMERCIAL

## 2025-04-04 ENCOUNTER — APPOINTMENT (OUTPATIENT)
Dept: PRIMARY CARE | Facility: CLINIC | Age: 55
End: 2025-04-04
Payer: COMMERCIAL

## 2025-04-04 DIAGNOSIS — E66.3 OVERWEIGHT (BMI 25.0-29.9): Primary | ICD-10-CM

## 2025-04-04 PROCEDURE — 99213 OFFICE O/P EST LOW 20 MIN: CPT | Performed by: FAMILY MEDICINE

## 2025-04-04 ASSESSMENT — ENCOUNTER SYMPTOMS
DYSURIA: 0
LIGHT-HEADEDNESS: 0
SINUS PAIN: 0
HEMATURIA: 0
COUGH: 0
WEAKNESS: 0
TREMORS: 0
PALPITATIONS: 0
FEVER: 0
NAUSEA: 0
SHORTNESS OF BREATH: 0
VOMITING: 0
ABDOMINAL PAIN: 0
JOINT SWELLING: 0
DYSPHORIC MOOD: 0
TROUBLE SWALLOWING: 0
BRUISES/BLEEDS EASILY: 0
FATIGUE: 0
BLOOD IN STOOL: 0
DIARRHEA: 0
UNEXPECTED WEIGHT CHANGE: 0

## 2025-04-04 NOTE — PROGRESS NOTES
Subjective   Patient ID: Lynette Severino is a 54 y.o. female.    Patient here to refill phentermine. There is no height or weight on file to calculate BMI. She has not lost weight. Understands the medication, risk and benefits. No uncontrolled BP. No hx stroke, MI. Is following a healthy diet with a caloric goal below 1200 daily. She is committed to exercising at least 3x a week for at least 40 minutes. She does not smoke. She has no CP, SOB or palpitations.          Review of Systems   Constitutional:  Negative for fatigue, fever and unexpected weight change.   HENT:  Negative for congestion, ear pain, nosebleeds, sinus pain and trouble swallowing.    Eyes:  Negative for visual disturbance.   Respiratory:  Negative for cough and shortness of breath.    Cardiovascular:  Negative for chest pain and palpitations.   Gastrointestinal:  Negative for abdominal pain, blood in stool, diarrhea, nausea and vomiting.   Genitourinary:  Negative for dysuria and hematuria.   Musculoskeletal:  Negative for gait problem and joint swelling.   Neurological:  Negative for tremors, weakness and light-headedness.   Hematological:  Does not bruise/bleed easily.   Psychiatric/Behavioral:  Negative for dysphoric mood and suicidal ideas.      There were no vitals filed for this visit.   There is no height or weight on file to calculate BMI.  Objective   Physical Exam  Constitutional:       General: She is not in acute distress.     Appearance: She is not toxic-appearing.   Neurological:      General: No focal deficit present.      Mental Status: She is alert and oriented to person, place, and time.   Psychiatric:         Mood and Affect: Mood normal.         Behavior: Behavior normal.         Thought Content: Thought content normal.         Judgment: Judgment normal.         Last Labs:     CMP:   Lab Results   Component Value Date    CALCIUM 8.2 (L) 09/16/2024    CALCIUM 8.8 05/29/2024    PROT 6.7 09/16/2024    PROT 6.7 05/29/2024     "ALBUMIN 3.9 09/16/2024    ALBUMIN 4.3 05/29/2024    AST 9 09/16/2024    AST 12 05/29/2024    ALKPHOS 58 09/16/2024    ALKPHOS 47 05/29/2024    BILITOT 0.4 09/16/2024    BILITOT 0.5 05/29/2024     CBC:   Lab Results   Component Value Date    WBC 12.4 (H) 09/16/2024    WBC 4.6 05/29/2024    HGB 13.1 09/16/2024    HGB 13.9 05/29/2024    HCT 38.0 09/16/2024    HCT 42.1 05/29/2024    MCV 88 09/16/2024    MCV 92 05/29/2024     09/16/2024     05/29/2024     A1C:   Lab Results   Component Value Date    HGBA1C 4.9 05/29/2024    HGBA1C 5.1 03/31/2022     LIPID PANEL:   Lab Results   Component Value Date    CHOL 164 05/29/2024    CHOL 185 04/05/2022    TRIG 64 05/29/2024    TRIG 55 04/05/2022    HDL 63.8 05/29/2024    HDL 73.9 04/05/2022    CHHDL 2.6 05/29/2024    CHHDL 2.5 04/05/2022    LDLF 100 (H) 04/05/2022    VLDL 13 05/29/2024    VLDL 11 04/05/2022    NHDL 100 05/29/2024     TSH:   Lab Results   Component Value Date    TSH 0.11 (L) 05/29/2024    TSH 0.88 03/31/2022     PSA:   No results found for: \"PSA\"     Assessment/Plan   There are no diagnoses linked to this encounter.    "

## 2025-04-04 NOTE — PATIENT INSTRUCTIONS
RF phentermine  Risk and benefit discussed  Discussed caloric limit for age and gender, [place] Brittany recommended  Exercise at least 3x a week at least 45 min each session aerobically  OARRS reviewed  CSA and UDS current/updated  If no loss in 1 month will discontinue  Follow up 1 month

## 2025-04-14 ENCOUNTER — TELEPHONE (OUTPATIENT)
Dept: PRIMARY CARE | Facility: CLINIC | Age: 55
End: 2025-04-14
Payer: COMMERCIAL

## 2025-04-14 DIAGNOSIS — E66.3 OVERWEIGHT (BMI 25.0-29.9): ICD-10-CM

## 2025-04-14 RX ORDER — PHENTERMINE HYDROCHLORIDE 37.5 MG/1
37.5 CAPSULE ORAL
Qty: 30 CAPSULE | Refills: 0 | Status: SHIPPED | OUTPATIENT
Start: 2025-04-14 | End: 2025-05-14

## 2025-04-14 NOTE — TELEPHONE ENCOUNTER
Seen for virtual visit for phentermine on 4/4/2025. States no script sent to pharmacy yet. [facility].

## 2025-06-19 RX ORDER — HYDROCORTISONE 25 MG/G
CREAM TOPICAL AS NEEDED
COMMUNITY
Start: 2025-06-18

## 2025-06-24 ENCOUNTER — TELEMEDICINE (OUTPATIENT)
Dept: PRIMARY CARE | Facility: CLINIC | Age: 55
End: 2025-06-24
Payer: COMMERCIAL

## 2025-06-24 DIAGNOSIS — R91.1 LUNG NODULE: ICD-10-CM

## 2025-06-24 DIAGNOSIS — J98.4 CALCIFIED GRANULOMA OF LUNG: Primary | ICD-10-CM

## 2025-06-24 PROCEDURE — 1036F TOBACCO NON-USER: CPT | Performed by: NURSE PRACTITIONER

## 2025-06-24 PROCEDURE — 99202 OFFICE O/P NEW SF 15 MIN: CPT | Performed by: NURSE PRACTITIONER

## 2025-06-24 RX ORDER — ATOMOXETINE 40 MG/1
40 CAPSULE ORAL EVERY MORNING
COMMUNITY
Start: 2025-04-22 | End: 2025-06-24 | Stop reason: ALTCHOICE

## 2025-06-24 RX ORDER — BUPROPION HYDROCHLORIDE 150 MG/1
150 TABLET ORAL DAILY
COMMUNITY
Start: 2025-05-06

## 2025-06-24 RX ORDER — DEXTROAMPHETAMINE SACCHARATE, AMPHETAMINE ASPARTATE MONOHYDRATE, DEXTROAMPHETAMINE SULFATE AND AMPHETAMINE SULFATE 1.25; 1.25; 1.25; 1.25 MG/1; MG/1; MG/1; MG/1
5 CAPSULE, EXTENDED RELEASE ORAL EVERY MORNING
COMMUNITY
Start: 2025-06-18

## 2025-06-24 SDOH — ECONOMIC STABILITY: FOOD INSECURITY: WITHIN THE PAST 12 MONTHS, YOU WORRIED THAT YOUR FOOD WOULD RUN OUT BEFORE YOU GOT MONEY TO BUY MORE.: NEVER TRUE

## 2025-06-24 SDOH — ECONOMIC STABILITY: FOOD INSECURITY: WITHIN THE PAST 12 MONTHS, THE FOOD YOU BOUGHT JUST DIDN'T LAST AND YOU DIDN'T HAVE MONEY TO GET MORE.: NEVER TRUE

## 2025-06-24 ASSESSMENT — LIFESTYLE VARIABLES
HOW OFTEN DO YOU HAVE SIX OR MORE DRINKS ON ONE OCCASION: NEVER
HOW OFTEN DO YOU HAVE A DRINK CONTAINING ALCOHOL: NEVER
AUDIT-C TOTAL SCORE: 0
SKIP TO QUESTIONS 9-10: 1
HOW MANY STANDARD DRINKS CONTAINING ALCOHOL DO YOU HAVE ON A TYPICAL DAY: PATIENT DOES NOT DRINK

## 2025-06-24 ASSESSMENT — ENCOUNTER SYMPTOMS
LOSS OF SENSATION IN FEET: 0
DEPRESSION: 0
OCCASIONAL FEELINGS OF UNSTEADINESS: 0

## 2025-06-24 ASSESSMENT — PAIN SCALES - GENERAL: PAINLEVEL_OUTOF10: 0-NO PAIN

## 2025-06-24 NOTE — PATIENT INSTRUCTIONS
Calcified granuloma of the left lower lobe.  Stable appearing subpleural nodular opacity in the left lung measuring 1.0 x 0.7 cm.  Recommend CT Chest without IV contrast in 6 months.  She will be notified of results as they become available.    We did discuss CT PET CT chest as an option.    She will decide and follow up as needed.    She does have a follow up with Pulmonary next month.        [facility]  [address]  [address]  [place], [place] [address]  Phone [phone]  Fax [phone]  Nurse Coordinator [phone]                                          Welcome to the [facility]    Today was the initial consult with the lung nodule clinic to determine proper recommendations for follow up. Your care is coordinated to ensure timely management.  As you know, early detection of cancer is very important.  Nodules that are large, look suspicious or have changed over time is why further evaluation such as the additional imaging test that we have ordered is needed. Our clinic will work closely with you in choosing the best next step.       What is my next step?  We will assist with scheduling scans, results reviews, and referrals for priority appointments.      Who do I call?  Your care coordinator for the lung nodule clinic can be contacted at [phone]  All scheduling needs can be assisted within the Cardiac Surgery/Thoracic Surgery/Lung Nodule Clinic offices at [phone].              Table  Keira H, Arlin DP, Leighton REYNA, et al. Guidelines for Management of Incidental Pulmonary Nodules Detected on CT Images: From the Fleischner Society 2017. Radiology 2017;284:228-243.       Recommend CT chest in 6 to 12 months for continued stability

## 2025-06-24 NOTE — PROGRESS NOTES
Subjective   Patient ID: Lynette Severino is a 54 y.o. female who presents for No chief complaint on file..  HPI 54-year-old female presents today for lung nodule clinic.    Telehealth visit between Lynette Severino and Maira George CNP at [facility] lung nodule clinic per patient request.    Smoking history: Never  Personal history of cancer:No   Family history of lung cancer: Mother had head and neck cancer. Maternal grandmother had breast cancer. Maternal great grandmother had ovarian cancer. Three maternal uncles and a maternal aunt all had cancer. Maternal aunt with stage IV lung cancer.     CT chest without IV contrast dated June 2, 2025   Again seen is a calcified granuloma within the left lower lobe   (series 3, image #119).   Stable appearing subpleural nodular opacity left lung base, measuring approximately 1.0 x 0.7 cm (series 2, image  #211).  Although findings may relate to a region of atelectasis or scarring, continued interval surveillance is recommended. A calcified   granuloma is again seen within the left lower lobe (series 2, image   #162). No new pulmonary nodule is identified on today's examination.   There are prominent, less than 1 cm bilateral axillary lymph nodes, likely reactive, stable.  No michelle mediastinal or hilar lymphadenopathy is identified.     CT chest without IV contrast dated 1/6/2025  There is no pneumothorax or endobronchial   lesion.  Again seen is a calcified granuloma within the left lower lobe   (series 3, image #119).  Interval increase in size of subpleural nodular   opacity left lung base, currently measuring approximately 1.0 x 0.6 cm,  previously 0.8 x 0.4 cm (series 3, image #155).  Although findings may  relate to a region of atelectasis or scarring, continued interval surveillance is recommended.  No new pulmonary nodule is identified on today's examination.     CT Angio Chest with and without IV contrast 9/18/2024  CT supporting focal atelectasis on   underlying scarring.  Small focal  subpleural nodular density inferolateral left lower lobe has mildly increased from same date abdominal CT supporting focal atelectasis on underlying scarring; follow-up as below.   Lower neck, lymph nodes, and mediastinum:  No adenopathy.     Review of Systems  Review of systems: Present-feeling well. Not present-chills, fatigue and fever.  Respiratory: Not present-difficulty breathing, cough, bloody sputum.  Cardiovascular: Not present-chest pain, palpitations, dyspnea on exertion.  Objective   There were no vitals taken for this visit.     Physical Exam  Gen.: Mental status-alert. Gen. appearance-cooperative, well groomed and consistent with stated age. Not in acute distress or sickly. Orientation-oriented to time, place, purpose and person. Build and nutrition-well-nourished and well-developed. Hydration-well-hydrated.    Assessment/Plan   Diagnoses and all orders for this visit:  Calcified granuloma of lung  -     CT chest wo IV contrast; Future  Lung nodule  -     CT chest wo IV contrast; Future

## 2025-08-21 DIAGNOSIS — E03.9 ADULT HYPOTHYROIDISM: ICD-10-CM

## 2025-08-21 RX ORDER — LEVOTHYROXINE SODIUM 150 UG/1
150 TABLET ORAL DAILY
Qty: 90 TABLET | Refills: 3 | Status: SHIPPED | OUTPATIENT
Start: 2025-08-21

## 2025-08-31 DIAGNOSIS — M25.50 ARTHRALGIA OF MULTIPLE SITES: ICD-10-CM

## 2025-09-02 RX ORDER — CELECOXIB 200 MG/1
CAPSULE ORAL
Qty: 30 CAPSULE | Refills: 5 | Status: SHIPPED | OUTPATIENT
Start: 2025-09-02